# Patient Record
Sex: MALE | Race: WHITE | Employment: FULL TIME | URBAN - METROPOLITAN AREA
[De-identification: names, ages, dates, MRNs, and addresses within clinical notes are randomized per-mention and may not be internally consistent; named-entity substitution may affect disease eponyms.]

---

## 2017-01-11 PROCEDURE — 84403 ASSAY OF TOTAL TESTOSTERONE: CPT | Performed by: UROLOGY

## 2017-01-11 PROCEDURE — 36415 COLL VENOUS BLD VENIPUNCTURE: CPT | Performed by: UROLOGY

## 2017-01-23 ENCOUNTER — HOSPITAL ENCOUNTER (OUTPATIENT)
Age: 42
Discharge: HOME | End: 2017-01-23
Attending: FAMILY MEDICINE
Payer: COMMERCIAL

## 2017-01-23 VITALS
HEIGHT: 73 IN | HEART RATE: 73 BPM | TEMPERATURE: 99 F | SYSTOLIC BLOOD PRESSURE: 119 MMHG | BODY MASS INDEX: 41.08 KG/M2 | WEIGHT: 310 LBS | RESPIRATION RATE: 16 BRPM | DIASTOLIC BLOOD PRESSURE: 79 MMHG | OXYGEN SATURATION: 97 %

## 2017-01-23 DIAGNOSIS — J02.0 STREPTOCOCCAL SORE THROAT: Primary | ICD-10-CM

## 2017-01-23 DIAGNOSIS — L30.0 NUMMULAR ECZEMA: ICD-10-CM

## 2017-01-23 LAB — POCT RAPID STREP: POSITIVE

## 2017-01-23 PROCEDURE — 87430 STREP A AG IA: CPT | Performed by: FAMILY MEDICINE

## 2017-01-23 PROCEDURE — 99214 OFFICE O/P EST MOD 30 MIN: CPT

## 2017-01-23 PROCEDURE — 99213 OFFICE O/P EST LOW 20 MIN: CPT

## 2017-01-23 RX ORDER — CEPHALEXIN 500 MG/1
500 CAPSULE ORAL 3 TIMES DAILY
Qty: 30 CAPSULE | Refills: 0 | Status: SHIPPED | OUTPATIENT
Start: 2017-01-23 | End: 2017-02-02

## 2017-01-23 NOTE — ED PROVIDER NOTES
Patient presents with:  Sore Throat    HPI:     Alexys Mckeon is a 42year old male who presents for evaluation of a chief complaint of sore throat, swollen glands, myalgias, headahce, fever, chills, sinus congestion, slight cough pain while swallowing both ears  Nose: Nares normal. Septum midline. Mucosa normal. No drainage or sinus tenderness.   Throat: abnormal findings: moderate oropharyngeal erythema  Neck: moderate anterior cervical adenopathy, no adenopathy, no carotid bruit, no JVD and supple, sym [address] [facility]  [phone]    In 1 week  For re-check

## 2017-01-23 NOTE — ED INITIAL ASSESSMENT (HPI)
States 10 days of sore throat and slight cough with sinus congestion. No fever. Wife diagnosed with strep this am. Tonsils swollen and red.

## 2017-02-07 DIAGNOSIS — N52.9 VASCULOGENIC ERECTILE DYSFUNCTION, UNSPECIFIED VASCULOGENIC ERECTILE DYSFUNCTION TYPE: Primary | ICD-10-CM

## 2017-02-07 PROCEDURE — 84402 ASSAY OF FREE TESTOSTERONE: CPT | Performed by: UROLOGY

## 2017-02-07 PROCEDURE — 83002 ASSAY OF GONADOTROPIN (LH): CPT | Performed by: UROLOGY

## 2017-02-07 PROCEDURE — 36415 COLL VENOUS BLD VENIPUNCTURE: CPT | Performed by: UROLOGY

## 2017-02-07 PROCEDURE — 84146 ASSAY OF PROLACTIN: CPT | Performed by: UROLOGY

## 2017-02-07 PROCEDURE — 84403 ASSAY OF TOTAL TESTOSTERONE: CPT | Performed by: UROLOGY

## 2017-02-07 RX ORDER — TADALAFIL 10 MG/1
10 TABLET ORAL
Qty: 30 TABLET | Refills: 0 | Status: SHIPPED | OUTPATIENT
Start: 2017-02-07 | End: 2017-10-30

## 2017-02-07 NOTE — TELEPHONE ENCOUNTER
Script sent, but he should be following up with urology also. Looks like they had given him some samples and were adjusting the doses.

## 2017-02-08 NOTE — TELEPHONE ENCOUNTER
Last refill: 11/17/2016 #30 with 1 refill    Last Visit: 11/17/2016    Next Visit: Future Appointments  Date Time Provider Iva Montano          5/18/2017 9:15 AM DO POLINA Simmons [facility] Jackie Serna         Forward to Dr. Dmitri Guzman please advise on re

## 2017-04-19 NOTE — TELEPHONE ENCOUNTER
Last OV 11/17/16  Last refilled 2/8/17  #30  1 refill    Future Appointments  Date Time Provider Iva Montano   5/18/2017 9:15 AM DO POLINA Coon

## 2017-05-16 ENCOUNTER — HOSPITAL ENCOUNTER (OUTPATIENT)
Age: 42
Discharge: HOME | End: 2017-05-16
Attending: EMERGENCY MEDICINE
Payer: COMMERCIAL

## 2017-05-16 VITALS
HEART RATE: 80 BPM | SYSTOLIC BLOOD PRESSURE: 146 MMHG | BODY MASS INDEX: 42 KG/M2 | TEMPERATURE: 98 F | DIASTOLIC BLOOD PRESSURE: 88 MMHG | WEIGHT: 315 LBS | RESPIRATION RATE: 18 BRPM

## 2017-05-16 DIAGNOSIS — I83.91 VARICOSE VEINS OF RIGHT LOWER EXTREMITY: ICD-10-CM

## 2017-05-16 DIAGNOSIS — T14.8XXA ABRASION: Primary | ICD-10-CM

## 2017-05-16 PROCEDURE — 99212 OFFICE O/P EST SF 10 MIN: CPT

## 2017-05-16 PROCEDURE — 99213 OFFICE O/P EST LOW 20 MIN: CPT

## 2017-05-16 NOTE — ED INITIAL ASSESSMENT (HPI)
Today noticed bleeding from right lateral aspect of ankle region - denies injury - states bleeding filled shoe and was \"spurting\" - states applied pressure with gauze and an ace bandage - no active bleeding at present time

## 2017-05-16 NOTE — ED NOTES
Lower extremity (ankle / foot) swollen and red / purple in color - no active bleeding noted at present time

## 2017-05-17 NOTE — ED PROVIDER NOTES
Patient presents with:  Bleeding    HPI:     Gabriel Ray is a 42year old male who presents with chief complaint of bleeding wound to the RLE. Today pt noticed he was bleeding after his shower. That then stopped.   He went to take his child to the  abrasions. Diagnostics:     N/a    MDM:     Pt was experiencing bleeding from a varicose vein likely due to localized trauma. Adequate clot formed at this time, no need for cauterization at this time.   Likely was under a lot of pressure, causing it to

## 2017-05-18 ENCOUNTER — OFFICE VISIT (OUTPATIENT)
Dept: FAMILY MEDICINE CLINIC | Facility: CLINIC | Age: 42
End: 2017-05-18

## 2017-05-18 VITALS
DIASTOLIC BLOOD PRESSURE: 76 MMHG | TEMPERATURE: 98 F | SYSTOLIC BLOOD PRESSURE: 128 MMHG | HEART RATE: 62 BPM | WEIGHT: 315 LBS | BODY MASS INDEX: 43 KG/M2

## 2017-05-18 DIAGNOSIS — N46.9 INFERTILITY MALE: ICD-10-CM

## 2017-05-18 DIAGNOSIS — N52.9 VASCULOGENIC ERECTILE DYSFUNCTION, UNSPECIFIED VASCULOGENIC ERECTILE DYSFUNCTION TYPE: ICD-10-CM

## 2017-05-18 DIAGNOSIS — I83.90 VARICOSE VEIN OF LEG: Primary | ICD-10-CM

## 2017-05-18 DIAGNOSIS — F32.2 SEVERE SINGLE CURRENT EPISODE OF MAJOR DEPRESSIVE DISORDER, WITHOUT PSYCHOTIC FEATURES (HCC): ICD-10-CM

## 2017-05-18 DIAGNOSIS — I83.891 BLEEDING FROM VARICOSE VEIN, RIGHT: ICD-10-CM

## 2017-05-18 PROCEDURE — 99214 OFFICE O/P EST MOD 30 MIN: CPT | Performed by: FAMILY MEDICINE

## 2017-05-18 RX ORDER — FLUOXETINE 20 MG/1
20 TABLET, FILM COATED ORAL DAILY
Qty: 90 TABLET | Refills: 1 | Status: SHIPPED | OUTPATIENT
Start: 2017-05-18 | End: 2017-09-29

## 2017-05-18 NOTE — PROGRESS NOTES
Fady Greene is a 42year old male. Patient presents with:  Medication Follow-Up: perpt   [facility] F/U      HPI:   UC follow up, bleeding: Had a popped varicose vein on the RLE. Was wearing a new brace. Was leaving a blood trail.  Was at a park Alcohol Use: Yes           1.8 oz/week       3 Standard drinks or equivalent per week       Comment: socially       BP Readings from Last 6 Encounters:  05/18/17 : 128/76  05/16/17 : 146/88  01/23/17 : 119/79  12/29/16 : 152/84  11/17/16 : 118/84  09/2 sandals. Bleeding from varicose vein, right  - resolved. Severe single current episode of major depressive disorder, without psychotic features ([address]  -     FLUoxetine HCl 20 MG Oral Tab; Take 1 tablet (20 mg total) by mouth daily.   - continue same

## 2017-07-18 NOTE — TELEPHONE ENCOUNTER
Attempted to contact patient but message states memory is full, enter remote access code.   Unable to leave message Statement Selected

## 2017-09-29 DIAGNOSIS — F32.2 SEVERE SINGLE CURRENT EPISODE OF MAJOR DEPRESSIVE DISORDER, WITHOUT PSYCHOTIC FEATURES (HCC): ICD-10-CM

## 2017-09-29 RX ORDER — FLUOXETINE 20 MG/1
20 TABLET, FILM COATED ORAL DAILY
Qty: 90 TABLET | Refills: 1 | Status: SHIPPED | OUTPATIENT
Start: 2017-09-29 | End: 2017-12-24 | Stop reason: CLARIF

## 2017-09-29 NOTE — TELEPHONE ENCOUNTER
FLUoxetine HCl 20 MG Oral Tab 90 tablet 1 5/18/2017     Sig - Route:  Take 1 tablet (20 mg total) by mouth daily. - Oral      [facility]

## 2017-10-02 ENCOUNTER — HOSPITAL ENCOUNTER (OUTPATIENT)
Age: 42
Discharge: HOME | End: 2017-10-02
Attending: EMERGENCY MEDICINE
Payer: COMMERCIAL

## 2017-10-02 VITALS
OXYGEN SATURATION: 97 % | SYSTOLIC BLOOD PRESSURE: 129 MMHG | HEART RATE: 98 BPM | TEMPERATURE: 99 F | DIASTOLIC BLOOD PRESSURE: 81 MMHG | RESPIRATION RATE: 20 BRPM

## 2017-10-02 DIAGNOSIS — J02.0 STREPTOCOCCAL SORE THROAT: Primary | ICD-10-CM

## 2017-10-02 PROCEDURE — 99214 OFFICE O/P EST MOD 30 MIN: CPT

## 2017-10-02 PROCEDURE — 87430 STREP A AG IA: CPT | Performed by: EMERGENCY MEDICINE

## 2017-10-02 PROCEDURE — 99213 OFFICE O/P EST LOW 20 MIN: CPT

## 2017-10-02 RX ORDER — AMOXICILLIN 500 MG/1
500 TABLET, FILM COATED ORAL 2 TIMES DAILY
Qty: 20 TABLET | Refills: 0 | Status: SHIPPED | OUTPATIENT
Start: 2017-10-02 | End: 2017-10-12

## 2017-10-02 NOTE — ED PROVIDER NOTES
Patient presents with:  Sore Throat  Cough  Body ache and/or chills    HPI:     Magalie Saleh is a 42year old male who presents with chief complaint of sore throat, body aches, cough. Started yesterday. 11year old son recently diagnosed with strep. instructions.

## 2017-10-30 NOTE — PROGRESS NOTES
Ashley Calvin is a 42year old male.  Patient presents with:  Cough: productive   Sore Throat: started friday afternoon  Runny Nose  Discharge: oder, jock area, inflammed    HPI:   Shola Party presents to the office with complaints of upper respiratory tract clotting tendency (HCC)     right leg   • Depression    • DVT (deep venous thrombosis) ([facility])    • Erectile dysfunction 3/31/2016   • Presence of IVC filter 3/31/2016   • Pulmonary embolism [facility])       Past Surgical History:  No date: BACK SURGERY  No date: RRR; no S3, no S4; no click; murmur negative  LUNGS: clear to percussion and auscultation  ABD: non distended, no masses, bowel sounds present, non tender  EXT: no edema    ASSESSMENT AND PLAN:   Ashley Calvin is a 42year old male who presents with To

## 2017-11-26 ENCOUNTER — HOSPITAL ENCOUNTER (OUTPATIENT)
Age: 42
Discharge: HOME | End: 2017-11-26
Attending: EMERGENCY MEDICINE
Payer: COMMERCIAL

## 2017-11-26 ENCOUNTER — APPOINTMENT (OUTPATIENT)
Dept: GENERAL RADIOLOGY | Age: 42
End: 2017-11-26
Attending: EMERGENCY MEDICINE
Payer: COMMERCIAL

## 2017-11-26 VITALS
HEART RATE: 79 BPM | DIASTOLIC BLOOD PRESSURE: 80 MMHG | OXYGEN SATURATION: 98 % | TEMPERATURE: 98 F | BODY MASS INDEX: 41.75 KG/M2 | WEIGHT: 315 LBS | HEIGHT: 73 IN | SYSTOLIC BLOOD PRESSURE: 139 MMHG | RESPIRATION RATE: 20 BRPM

## 2017-11-26 DIAGNOSIS — J06.9 VIRAL URI: Primary | ICD-10-CM

## 2017-11-26 PROCEDURE — 99214 OFFICE O/P EST MOD 30 MIN: CPT

## 2017-11-26 PROCEDURE — 71020 XR CHEST PA + LAT CHEST (CPT=71020): CPT | Performed by: EMERGENCY MEDICINE

## 2017-11-26 PROCEDURE — 94640 AIRWAY INHALATION TREATMENT: CPT

## 2017-11-26 RX ORDER — IPRATROPIUM BROMIDE AND ALBUTEROL SULFATE 2.5; .5 MG/3ML; MG/3ML
3 SOLUTION RESPIRATORY (INHALATION) ONCE
Status: COMPLETED | OUTPATIENT
Start: 2017-11-26 | End: 2017-11-26

## 2017-11-26 RX ORDER — BENZONATATE 200 MG/1
200 CAPSULE ORAL 3 TIMES DAILY PRN
Qty: 30 CAPSULE | Refills: 0 | Status: SHIPPED | OUTPATIENT
Start: 2017-11-26 | End: 2017-12-24 | Stop reason: CLARIF

## 2017-11-26 NOTE — ED PROVIDER NOTES
Patient presents with:  Cough/URI    HPI:     Ivan Bird is a 42year old male who presents with chief complaint of cough. Started about a week ago with cough and congestion. He has had intermittent eye discharge that has resolved.   Cough is assoc [place], XR CHEST PA + LAT CHEST (CPT=71020), 3/02/2016, 12:55. TECHNIQUE:  PA and lateral chest radiographs were obtained.   PATIENT STATED HISTORY: (As transcribed by Technologist)  Patient states he has had a cough and congestion fo

## 2017-11-26 NOTE — ED INITIAL ASSESSMENT (HPI)
Patient has had a cough for about 1 week. He has had strep twice in the past month and now has developed a cough. He had crusted pink eyes a few days ago that are clear today.

## 2017-12-20 ENCOUNTER — HOSPITAL ENCOUNTER (OUTPATIENT)
Age: 42
Discharge: HOME | End: 2017-12-20
Payer: COMMERCIAL

## 2017-12-20 VITALS
HEART RATE: 100 BPM | RESPIRATION RATE: 16 BRPM | WEIGHT: 315 LBS | BODY MASS INDEX: 43 KG/M2 | OXYGEN SATURATION: 99 % | TEMPERATURE: 101 F | DIASTOLIC BLOOD PRESSURE: 79 MMHG | SYSTOLIC BLOOD PRESSURE: 128 MMHG

## 2017-12-20 DIAGNOSIS — R05.9 COUGH: ICD-10-CM

## 2017-12-20 DIAGNOSIS — H10.32 ACUTE BACTERIAL CONJUNCTIVITIS OF LEFT EYE: Primary | ICD-10-CM

## 2017-12-20 PROCEDURE — 99213 OFFICE O/P EST LOW 20 MIN: CPT

## 2017-12-20 PROCEDURE — 99214 OFFICE O/P EST MOD 30 MIN: CPT

## 2017-12-20 RX ORDER — SULFACETAMIDE SODIUM 100 MG/ML
2 SOLUTION/ DROPS OPHTHALMIC
Qty: 1 BOTTLE | Refills: 0 | Status: SHIPPED | OUTPATIENT
Start: 2017-12-20 | End: 2017-12-30

## 2017-12-21 NOTE — ED PROVIDER NOTES
Patient Seen in: [id number] [facility]    History   Patient presents with:  Conjunctivitis  Flu    Stated Complaint: pink eye     76-year-old male who presents to the immediate care with complaints of left eye redness discharge and itchiness fo Negative for ear pain. Eyes: Positive for discharge, redness and itching. Negative for photophobia, pain and visual disturbance. Respiratory: Positive for cough. Negative for shortness of breath and wheezing. Skin: Negative.     Neurological: Ana Dee sounds normal. No respiratory distress. He has no wheezes. He has no rales. He exhibits no tenderness. Dry sporadic cough noted   Musculoskeletal: Normal range of motion. Neurological: He is alert and oriented to person, place, and time.    Skin: Skin i

## 2017-12-24 ENCOUNTER — HOSPITAL ENCOUNTER (OUTPATIENT)
Age: 42
Discharge: HOME | End: 2017-12-24
Payer: COMMERCIAL

## 2017-12-24 ENCOUNTER — APPOINTMENT (OUTPATIENT)
Dept: GENERAL RADIOLOGY | Age: 42
End: 2017-12-24
Attending: NURSE PRACTITIONER
Payer: COMMERCIAL

## 2017-12-24 VITALS
SYSTOLIC BLOOD PRESSURE: 123 MMHG | BODY MASS INDEX: 41.75 KG/M2 | HEIGHT: 73 IN | HEART RATE: 100 BPM | WEIGHT: 315 LBS | OXYGEN SATURATION: 96 % | RESPIRATION RATE: 14 BRPM | TEMPERATURE: 100 F | DIASTOLIC BLOOD PRESSURE: 70 MMHG

## 2017-12-24 DIAGNOSIS — J18.9 COMMUNITY ACQUIRED PNEUMONIA OF LEFT LOWER LOBE OF LUNG: Primary | ICD-10-CM

## 2017-12-24 PROCEDURE — 87081 CULTURE SCREEN ONLY: CPT | Performed by: NURSE PRACTITIONER

## 2017-12-24 PROCEDURE — 87430 STREP A AG IA: CPT | Performed by: NURSE PRACTITIONER

## 2017-12-24 PROCEDURE — 96366 THER/PROPH/DIAG IV INF ADDON: CPT

## 2017-12-24 PROCEDURE — 80047 BASIC METABLC PNL IONIZED CA: CPT

## 2017-12-24 PROCEDURE — 71020 XR CHEST PA + LAT CHEST (CPT=71020): CPT | Performed by: NURSE PRACTITIONER

## 2017-12-24 PROCEDURE — 85025 COMPLETE CBC W/AUTO DIFF WBC: CPT | Performed by: NURSE PRACTITIONER

## 2017-12-24 PROCEDURE — 99214 OFFICE O/P EST MOD 30 MIN: CPT

## 2017-12-24 PROCEDURE — 94640 AIRWAY INHALATION TREATMENT: CPT

## 2017-12-24 PROCEDURE — 99215 OFFICE O/P EST HI 40 MIN: CPT

## 2017-12-24 PROCEDURE — 96365 THER/PROPH/DIAG IV INF INIT: CPT

## 2017-12-24 RX ORDER — SODIUM CHLORIDE 9 MG/ML
1000 INJECTION, SOLUTION INTRAVENOUS ONCE
Status: COMPLETED | OUTPATIENT
Start: 2017-12-24 | End: 2017-12-24

## 2017-12-24 RX ORDER — FLUTICASONE PROPIONATE 50 MCG
1 SPRAY, SUSPENSION (ML) NASAL 2 TIMES DAILY PRN
Qty: 16 G | Refills: 0 | Status: SHIPPED | OUTPATIENT
Start: 2017-12-24 | End: 2020-01-09 | Stop reason: ALTCHOICE

## 2017-12-24 RX ORDER — LEVOFLOXACIN 500 MG/1
500 TABLET, FILM COATED ORAL DAILY
Qty: 10 TABLET | Refills: 0 | Status: SHIPPED | OUTPATIENT
Start: 2017-12-24 | End: 2018-01-03

## 2017-12-24 RX ORDER — BENZONATATE 100 MG/1
100 CAPSULE ORAL 3 TIMES DAILY PRN
Qty: 30 CAPSULE | Refills: 0 | Status: SHIPPED | OUTPATIENT
Start: 2017-12-24 | End: 2018-01-23

## 2017-12-24 RX ORDER — ALBUTEROL SULFATE 90 UG/1
1 AEROSOL, METERED RESPIRATORY (INHALATION) EVERY 4 HOURS PRN
Qty: 1 INHALER | Refills: 0 | Status: SHIPPED | OUTPATIENT
Start: 2017-12-24

## 2017-12-24 RX ORDER — ALBUTEROL SULFATE 90 UG/1
1 AEROSOL, METERED RESPIRATORY (INHALATION) EVERY 4 HOURS PRN
Qty: 1 INHALER | Refills: 0 | Status: SHIPPED | OUTPATIENT
Start: 2017-12-24 | End: 2017-12-24

## 2017-12-24 RX ORDER — POTASSIUM CHLORIDE 20 MEQ/1
40 TABLET, EXTENDED RELEASE ORAL ONCE
Status: COMPLETED | OUTPATIENT
Start: 2017-12-24 | End: 2017-12-24

## 2017-12-24 RX ORDER — CODEINE PHOSPHATE AND GUAIFENESIN 10; 100 MG/5ML; MG/5ML
10 SOLUTION ORAL EVERY 6 HOURS PRN
Qty: 118 ML | Refills: 0 | Status: SHIPPED | OUTPATIENT
Start: 2017-12-24 | End: 2018-01-18 | Stop reason: ALTCHOICE

## 2017-12-24 RX ORDER — IPRATROPIUM BROMIDE AND ALBUTEROL SULFATE 2.5; .5 MG/3ML; MG/3ML
3 SOLUTION RESPIRATORY (INHALATION) ONCE
Status: COMPLETED | OUTPATIENT
Start: 2017-12-24 | End: 2017-12-24

## 2017-12-24 RX ORDER — ERYTHROMYCIN 5 MG/G
1 OINTMENT OPHTHALMIC EVERY 6 HOURS
Qty: 1 G | Refills: 0 | Status: SHIPPED | OUTPATIENT
Start: 2017-12-24 | End: 2017-12-31

## 2017-12-24 RX ORDER — PREDNISONE 20 MG/1
60 TABLET ORAL ONCE
Status: COMPLETED | OUTPATIENT
Start: 2017-12-24 | End: 2017-12-24

## 2017-12-24 NOTE — ED NOTES
Pt back from Xray with Tech, repositioned for comfort, DUO Neb started. Water given with pills, denies needs. Pain 6/10.

## 2017-12-24 NOTE — ED PROVIDER NOTES
[place] [address] [facility] [place]   Patient presents with:  Eye Problem  Cough/URI    Stated Complaint: LEFT RED EYE/SHORTNESS OF BREATH/COUGH/FEVER    41-year-old male presents today with complaints of persistent cough, congest BREATH/COUGH/FEVER  Other systems are as noted in HPI. Constitutional and vital signs reviewed. All other systems reviewed and negative except as noted above.     Physical Exam   ED Triage Vitals [12/24/17 0921]  BP: 140/81  Pulse: 110  Resp: 20  Temp as directed. Patient also given prescription for Cheratussin for cough. Patient also given prescription for erythromycin ointment for the conjunctivitis to the left eye. Prescription for albuterol was given for wheezing or shortness of breath.   Patient

## 2017-12-24 NOTE — ED INITIAL ASSESSMENT (HPI)
Pt states he was treated at [facility] for pink eye and it has worsened.   +cough and SOB

## 2018-01-18 ENCOUNTER — TELEPHONE (OUTPATIENT)
Dept: FAMILY MEDICINE CLINIC | Facility: CLINIC | Age: 43
End: 2018-01-18

## 2018-01-18 ENCOUNTER — OFFICE VISIT (OUTPATIENT)
Dept: FAMILY MEDICINE CLINIC | Facility: CLINIC | Age: 43
End: 2018-01-18

## 2018-01-18 VITALS
HEIGHT: 71 IN | HEART RATE: 76 BPM | SYSTOLIC BLOOD PRESSURE: 126 MMHG | DIASTOLIC BLOOD PRESSURE: 88 MMHG | WEIGHT: 315 LBS | TEMPERATURE: 98 F | RESPIRATION RATE: 20 BRPM | BODY MASS INDEX: 44.1 KG/M2

## 2018-01-18 DIAGNOSIS — J18.9 PNEUMONIA OF LEFT LOWER LOBE DUE TO INFECTIOUS ORGANISM: Primary | ICD-10-CM

## 2018-01-18 DIAGNOSIS — F32.2 SEVERE SINGLE CURRENT EPISODE OF MAJOR DEPRESSIVE DISORDER, WITHOUT PSYCHOTIC FEATURES (HCC): ICD-10-CM

## 2018-01-18 DIAGNOSIS — S89.92XA INJURY OF LEFT KNEE, INITIAL ENCOUNTER: ICD-10-CM

## 2018-01-18 PROCEDURE — 99214 OFFICE O/P EST MOD 30 MIN: CPT | Performed by: FAMILY MEDICINE

## 2018-01-18 RX ORDER — FLUOXETINE 20 MG/1
20 TABLET, FILM COATED ORAL DAILY
Qty: 90 TABLET | Refills: 1 | Status: SHIPPED | OUTPATIENT
Start: 2018-01-18 | End: 2018-01-18 | Stop reason: ALTCHOICE

## 2018-01-18 RX ORDER — FLUOXETINE 20 MG/1
20 TABLET, FILM COATED ORAL DAILY
Qty: 90 TABLET | Refills: 1 | COMMUNITY
Start: 2018-01-18 | End: 2018-01-18

## 2018-01-18 RX ORDER — FLUOXETINE HYDROCHLORIDE 20 MG/1
20 CAPSULE ORAL DAILY
Qty: 90 CAPSULE | Refills: 1 | Status: SHIPPED | OUTPATIENT
Start: 2018-01-18 | End: 2018-12-06

## 2018-01-18 NOTE — PROGRESS NOTES
Jair Issa is a 36year old male. Patient presents with: Follow - Up: from  for pneumonia per pt      HPI:   12/24/17 was in UC with pneumonia. Feeling much better. Now. Finished levaquin. Taking albuterol about 1-2 times per day.  Breathing is b Disp:  Rfl:       Past Medical History:   Diagnosis Date   • Blood clotting tendency ([address]     right leg   • Depression    • DVT (deep venous thrombosis) (HCC)    • Erectile dysfunction 3/31/2016   • Presence of IVC filter 3/31/2016   • Pulmonary embolism Pneumonia of left lower lobe due to infectious organism (hcc)  (primary encounter diagnosis)  Severe single current episode of major depressive disorder, without psychotic features (hcc)  Injury of left knee, initial encounter    Diagnoses and all orde

## 2018-01-18 NOTE — TELEPHONE ENCOUNTER
Galina from [address] called, wondering if we can change the script for generic (FLUoxetine HCl 20 MG Oral Tab) for Prozac from table to capsule? Tablets cost $50.00 each and capsules cost $1.61 each.    Please call pharmacy at [phone]

## 2018-01-25 ENCOUNTER — OFFICE VISIT (OUTPATIENT)
Dept: FAMILY MEDICINE CLINIC | Facility: CLINIC | Age: 43
End: 2018-01-25

## 2018-01-25 ENCOUNTER — TELEPHONE (OUTPATIENT)
Dept: FAMILY MEDICINE CLINIC | Facility: CLINIC | Age: 43
End: 2018-01-25

## 2018-01-25 VITALS
RESPIRATION RATE: 16 BRPM | HEIGHT: 71 IN | HEART RATE: 80 BPM | WEIGHT: 315 LBS | DIASTOLIC BLOOD PRESSURE: 82 MMHG | SYSTOLIC BLOOD PRESSURE: 120 MMHG | TEMPERATURE: 97 F | BODY MASS INDEX: 44.1 KG/M2

## 2018-01-25 DIAGNOSIS — F32.4 MAJOR DEPRESSIVE DISORDER WITH SINGLE EPISODE, IN PARTIAL REMISSION (HCC): ICD-10-CM

## 2018-01-25 DIAGNOSIS — E66.01 MORBID OBESITY (HCC): ICD-10-CM

## 2018-01-25 DIAGNOSIS — R06.00 DOE (DYSPNEA ON EXERTION): ICD-10-CM

## 2018-01-25 DIAGNOSIS — Z00.01 ENCOUNTER FOR ROUTINE ADULT PHYSICAL EXAM WITH ABNORMAL FINDINGS: Primary | ICD-10-CM

## 2018-01-25 DIAGNOSIS — Z82.49 FAMILY HISTORY OF MI (MYOCARDIAL INFARCTION): ICD-10-CM

## 2018-01-25 PROCEDURE — 36415 COLL VENOUS BLD VENIPUNCTURE: CPT | Performed by: FAMILY MEDICINE

## 2018-01-25 PROCEDURE — 80053 COMPREHEN METABOLIC PANEL: CPT | Performed by: FAMILY MEDICINE

## 2018-01-25 PROCEDURE — 85025 COMPLETE CBC W/AUTO DIFF WBC: CPT | Performed by: FAMILY MEDICINE

## 2018-01-25 PROCEDURE — 83036 HEMOGLOBIN GLYCOSYLATED A1C: CPT | Performed by: FAMILY MEDICINE

## 2018-01-25 PROCEDURE — 99396 PREV VISIT EST AGE 40-64: CPT | Performed by: FAMILY MEDICINE

## 2018-01-25 PROCEDURE — 80061 LIPID PANEL: CPT | Performed by: FAMILY MEDICINE

## 2018-01-25 NOTE — PROGRESS NOTES
Muriel Nichole is a 36year old male who presents for a complete physical exam.   HPI:   Pt complains of wanting to make sure he is well enough to exercise. No chest pain or pressure, but family h/o heart disease in most males on dad's side.  Some SOB wi Cyclobenzaprine HCl (FLEXERIL OR) Take  by mouth.  Disp:  Rfl:       Past Medical History:   Diagnosis Date   • Blood clotting tendency ([address]     right leg   • Depression    • DVT (deep venous thrombosis) (HCC)    • Erectile dysfunction 3/31/2016   • Pres long   NEURO: denies headaches  PSYCHE: denies depression or anxiety, moods have been bouncing with all that has been going on with a death in the family.    HEMATOLOGIC: denies hx of anemia   ENDOCRINE: denies thyroid history  ALL/ASTHMA: denies hx of nasir This Encounter      CBC With Differential With Platelet      Comp Metabolic Panel (14)      Hemoglobin A1C      Lipid Panel      *Venipuncture    Meds & Refills for this Visit:  No prescriptions requested or ordered in this encounter    Imaging & Consults:

## 2018-01-25 NOTE — TELEPHONE ENCOUNTER
Patient called and advised that he might be a few minutes late for his appointment with Dr Sol Altman at 1pm as he got stuck by a train. Advised patient he could still come if he wanted to but depending on how late he is he might have to reschedule.  We will ch

## 2018-01-26 LAB
ALBUMIN SERPL-MCNC: 3.6 G/DL (ref 3.5–4.8)
ALP LIVER SERPL-CCNC: 79 U/L (ref 45–117)
ALT SERPL-CCNC: 44 U/L (ref 17–63)
AST SERPL-CCNC: 22 U/L (ref 15–41)
BASOPHILS # BLD AUTO: 0.04 X10(3) UL (ref 0–0.1)
BASOPHILS NFR BLD AUTO: 0.6 %
BILIRUB SERPL-MCNC: 1.2 MG/DL (ref 0.1–2)
BUN BLD-MCNC: 21 MG/DL (ref 8–20)
CALCIUM BLD-MCNC: 8.4 MG/DL (ref 8.3–10.3)
CHLORIDE: 104 MMOL/L (ref 101–111)
CHOLEST SMN-MCNC: 152 MG/DL (ref ?–200)
CO2: 29 MMOL/L (ref 22–32)
CREAT BLD-MCNC: 0.94 MG/DL (ref 0.7–1.3)
EOSINOPHIL # BLD AUTO: 0.16 X10(3) UL (ref 0–0.3)
EOSINOPHIL NFR BLD AUTO: 2.5 %
ERYTHROCYTE [DISTWIDTH] IN BLOOD BY AUTOMATED COUNT: 12.7 % (ref 11.5–16)
EST. AVERAGE GLUCOSE BLD GHB EST-MCNC: 111 MG/DL (ref 68–126)
GLUCOSE BLD-MCNC: 84 MG/DL (ref 70–99)
HBA1C MFR BLD HPLC: 5.5 % (ref ?–5.7)
HCT VFR BLD AUTO: 42.6 % (ref 37–53)
HDLC SERPL-MCNC: 41 MG/DL (ref 45–?)
HDLC SERPL: 3.71 {RATIO} (ref ?–4.97)
HGB BLD-MCNC: 14.2 G/DL (ref 13–17)
IMMATURE GRANULOCYTE COUNT: 0.03 X10(3) UL (ref 0–1)
IMMATURE GRANULOCYTE RATIO %: 0.5 %
LDLC SERPL CALC-MCNC: 92 MG/DL (ref ?–130)
LYMPHOCYTES # BLD AUTO: 1.49 X10(3) UL (ref 0.9–4)
LYMPHOCYTES NFR BLD AUTO: 23.3 %
M PROTEIN MFR SERPL ELPH: 7.5 G/DL (ref 6.1–8.3)
MCH RBC QN AUTO: 29.9 PG (ref 27–33.2)
MCHC RBC AUTO-ENTMCNC: 33.3 G/DL (ref 31–37)
MCV RBC AUTO: 89.7 FL (ref 80–99)
MONOCYTES # BLD AUTO: 0.48 X10(3) UL (ref 0.1–0.6)
MONOCYTES NFR BLD AUTO: 7.5 %
NEUTROPHIL ABS PRELIM: 4.19 X10 (3) UL (ref 1.3–6.7)
NEUTROPHILS # BLD AUTO: 4.19 X10(3) UL (ref 1.3–6.7)
NEUTROPHILS NFR BLD AUTO: 65.6 %
NONHDLC SERPL-MCNC: 111 MG/DL (ref ?–130)
PLATELET # BLD AUTO: 190 10(3)UL (ref 150–450)
POTASSIUM SERPL-SCNC: 4 MMOL/L (ref 3.6–5.1)
RBC # BLD AUTO: 4.75 X10(6)UL (ref 4.3–5.7)
RED CELL DISTRIBUTION WIDTH-SD: 41.4 FL (ref 35.1–46.3)
SODIUM SERPL-SCNC: 139 MMOL/L (ref 136–144)
TRIGL SERPL-MCNC: 94 MG/DL (ref ?–150)
VLDLC SERPL CALC-MCNC: 19 MG/DL (ref 5–40)
WBC # BLD AUTO: 6.4 X10(3) UL (ref 4–13)

## 2018-02-07 ENCOUNTER — HOSPITAL ENCOUNTER (OUTPATIENT)
Dept: CV DIAGNOSTICS | Facility: HOSPITAL | Age: 43
Discharge: HOME | End: 2018-02-07
Attending: FAMILY MEDICINE
Payer: COMMERCIAL

## 2018-02-07 DIAGNOSIS — Z82.49 FAMILY HISTORY OF MI (MYOCARDIAL INFARCTION): ICD-10-CM

## 2018-02-07 DIAGNOSIS — R06.00 DOE (DYSPNEA ON EXERTION): ICD-10-CM

## 2018-02-07 DIAGNOSIS — E66.01 MORBID OBESITY (HCC): ICD-10-CM

## 2018-02-07 PROCEDURE — 93018 CV STRESS TEST I&R ONLY: CPT | Performed by: FAMILY MEDICINE

## 2018-02-07 PROCEDURE — 93350 STRESS TTE ONLY: CPT | Performed by: FAMILY MEDICINE

## 2018-02-07 PROCEDURE — 93017 CV STRESS TEST TRACING ONLY: CPT | Performed by: FAMILY MEDICINE

## 2018-05-08 DIAGNOSIS — B36.9 FUNGAL DERMATITIS: ICD-10-CM

## 2018-05-08 NOTE — TELEPHONE ENCOUNTER
PT STOPPED IN AND ADV THAT HE NEEDS REFILLS ON     FLUoxetine HCl 20 MG Oral Cap    AND       \"JOCK ITCH CREAM\"    PLEASE SEND TO [facility].     ADV DR OUT OF OFFICE UNTIL Thursday--F/U

## 2018-05-08 NOTE — TELEPHONE ENCOUNTER
Spoke with staff at [facility] who states they have script on file for fluoxetine 20 mg and will ready for patient.       Only needs refill of clotrimazole-betamethasone cream    Last OV 1/25/18  Last refilled 10/30/17  60 g  0 refills

## 2018-05-09 RX ORDER — CLOTRIMAZOLE AND BETAMETHASONE DIPROPIONATE 10; .64 MG/G; MG/G
1 CREAM TOPICAL 2 TIMES DAILY PRN
Qty: 60 G | Refills: 0 | Status: SHIPPED | OUTPATIENT
Start: 2018-05-09 | End: 2018-05-19

## 2018-09-13 ENCOUNTER — TELEPHONE (OUTPATIENT)
Dept: FAMILY MEDICINE CLINIC | Facility: CLINIC | Age: 43
End: 2018-09-13

## 2018-09-13 NOTE — TELEPHONE ENCOUNTER
Left message on voicemail/answering machine for patient to call office     Per Dr Michael You, patient is due for f/u for depression

## 2018-10-04 NOTE — TELEPHONE ENCOUNTER
Cell number is only number listed for patient on patient consent form. Do you want me to contact emergency contact?

## 2018-10-04 NOTE — TELEPHONE ENCOUNTER
Attempted to contact patient and again message states not a working number  Letter was sent 9/21/18    Please advises if any further action needed

## 2018-10-09 NOTE — TELEPHONE ENCOUNTER
Discussed with Dr Horvath Friday who states no need to contact emergency contact.    We have tried the patient contact number on file and sent a letter

## 2018-11-23 ENCOUNTER — IMAGING SERVICES (OUTPATIENT)
Dept: OTHER | Age: 43
End: 2018-11-23

## 2018-12-06 NOTE — PROGRESS NOTES
Eliud Downey is a 36year old male. Patient presents with: Follow - Up: on stress test, pneumonia  Pain: on right hip       HPI:   Depression: Called many counseling sites this fall, no one called him back. He is frustrated.  He had a dark moment in Oral Tab Take 81 mg by mouth daily. Disp:  Rfl:    Cinnamon 500 MG Oral Tab Take 1,000 mg by mouth daily. Disp:  Rfl:    Cyclobenzaprine HCl (FLEXERIL OR) Take  by mouth.  Disp:  Rfl:       Past Medical History:   Diagnosis Date   • Blood clotting tendency against force and with palpation of the proximal quadriceps muscle on right hip.      ASSESSMENT AND PLAN:     Severe single current episode of major depressive disorder, without psychotic features (hcc)  (primary encounter diagnosis)  Erectile dysfunction

## 2018-12-14 ENCOUNTER — TELEPHONE (OUTPATIENT)
Dept: FAMILY MEDICINE CLINIC | Facility: CLINIC | Age: 43
End: 2018-12-14

## 2018-12-14 NOTE — TELEPHONE ENCOUNTER
Left message on voicemail/answering machine for patient to call office     Dr Joselin Turcios has completed parking placard form   Original placed in  folder.   Copy sent to scanning

## 2019-01-24 ENCOUNTER — IMAGING SERVICES (OUTPATIENT)
Dept: OTHER | Age: 44
End: 2019-01-24

## 2019-12-31 ENCOUNTER — HOSPITAL ENCOUNTER (OUTPATIENT)
Age: 44
Discharge: HOME | End: 2019-12-31
Attending: FAMILY MEDICINE
Payer: COMMERCIAL

## 2019-12-31 VITALS — TEMPERATURE: 99 F | OXYGEN SATURATION: 97 % | RESPIRATION RATE: 18 BRPM | HEART RATE: 85 BPM

## 2019-12-31 DIAGNOSIS — J10.1 INFLUENZA A: Primary | ICD-10-CM

## 2019-12-31 LAB
POCT INFLUENZA A: POSITIVE
POCT INFLUENZA B: NEGATIVE

## 2019-12-31 PROCEDURE — 99213 OFFICE O/P EST LOW 20 MIN: CPT

## 2019-12-31 PROCEDURE — 87502 INFLUENZA DNA AMP PROBE: CPT | Performed by: FAMILY MEDICINE

## 2019-12-31 PROCEDURE — 99214 OFFICE O/P EST MOD 30 MIN: CPT

## 2019-12-31 RX ORDER — OSELTAMIVIR PHOSPHATE 75 MG/1
75 CAPSULE ORAL 2 TIMES DAILY
Qty: 10 CAPSULE | Refills: 0 | Status: SHIPPED | OUTPATIENT
Start: 2019-12-31 | End: 2020-01-05

## 2019-12-31 RX ORDER — BUPROPION HYDROCHLORIDE 100 MG/1
100 TABLET ORAL 2 TIMES DAILY
COMMUNITY
End: 2020-01-09 | Stop reason: DRUGHIGH

## 2019-12-31 RX ORDER — IBUPROFEN 600 MG/1
600 TABLET ORAL ONCE
Status: COMPLETED | OUTPATIENT
Start: 2019-12-31 | End: 2019-12-31

## 2019-12-31 NOTE — ED PROVIDER NOTES
Patient presents with:  Cough/URI    HPI:     Allen Morris is a 38year old male who presents with for chief complaint of fever, chills, nasal congestion, coryza, rhinorrhea, sore throat, cough, headache, myalgias, fatigue, malaise, tiredness along wi Buccal mucosa moist.   Neck: no adenopathy  Lungs: clear to auscultation bilaterally. No chest wall retractions. No respiratory distress.  No tachypnea noted  Heart: S1, S2 normal, no murmur, click, rub or gallop, regular rate and rhythm  Abdomen: soft, non

## 2020-01-09 NOTE — PROGRESS NOTES
Marva Pang is a 38year old male. Patient presents with: Follow - Up: on medications      HPI:   Depression: does not want to see psych anymore. They aren't doing therapy, just prescribing meds.  He is still trying to find a therapist that works wi Readings from Last 6 Encounters:  01/09/20 : (!) 336 lb (152.4 kg)  12/06/18 : (!) 336 lb (152.4 kg)  01/25/18 : (!) 323 lb (146.5 kg)  01/18/18 : (!) 325 lb (147.4 kg)  12/24/17 : (!) 325 lb (147.4 kg)  12/20/17 : (!) 325 lb (147.4 kg)      REVIEW OF SYST

## 2020-01-20 ENCOUNTER — HOSPITAL ENCOUNTER (OUTPATIENT)
Age: 45
Discharge: HOME | End: 2020-01-20
Payer: COMMERCIAL

## 2020-01-20 ENCOUNTER — APPOINTMENT (OUTPATIENT)
Dept: ULTRASOUND IMAGING | Age: 45
End: 2020-01-20
Attending: NURSE PRACTITIONER
Payer: COMMERCIAL

## 2020-01-20 VITALS
DIASTOLIC BLOOD PRESSURE: 101 MMHG | RESPIRATION RATE: 16 BRPM | TEMPERATURE: 97 F | SYSTOLIC BLOOD PRESSURE: 136 MMHG | HEART RATE: 75 BPM | OXYGEN SATURATION: 98 %

## 2020-01-20 DIAGNOSIS — I87.2 VENOUS STASIS DERMATITIS OF RIGHT LOWER EXTREMITY: Primary | ICD-10-CM

## 2020-01-20 PROCEDURE — 99214 OFFICE O/P EST MOD 30 MIN: CPT

## 2020-01-20 PROCEDURE — 93971 EXTREMITY STUDY: CPT | Performed by: NURSE PRACTITIONER

## 2020-01-20 RX ORDER — TRIAMCINOLONE ACETONIDE 0.25 MG/G
1 CREAM TOPICAL 2 TIMES DAILY
Qty: 80 G | Refills: 0 | Status: SHIPPED | OUTPATIENT
Start: 2020-01-20

## 2020-01-20 NOTE — ED PROVIDER NOTES
Patient Seen in: [address] [facility]      History   Patient presents with:  Lower Extremity Injury    Stated Complaint: pain in right leg x 1 week off and on    HPI  Patient is a 53-year-old male past with past medical history of provoked DVT leg x 1 week off and on  Other systems are as noted in HPI. Constitutional and vital signs reviewed. All other systems reviewed and negative except as noted above.     Physical Exam     ED Triage Vitals [01/20/20 1244]   BP (!) 136/101   Pulse 75   Re veins were imaged:  Common, deep, and superficial femoral, popliteal, sapheno-femoral junction, posterior tibial veins, and the contralateral common femoral vein. PATIENT STATED HISTORY: (As transcribed by Technologist)  Patient has RLE pain and swelling.

## 2020-01-20 NOTE — ED INITIAL ASSESSMENT (HPI)
Pt sts swelling, severe itching, sharp pains to right calf for the past 1 week. Denies SOB, chest pain. Hx of PE in 2011 resulting in right leg with a \"pump\" for circulation.

## 2020-06-04 ENCOUNTER — TELEPHONE (OUTPATIENT)
Dept: FAMILY MEDICINE CLINIC | Facility: CLINIC | Age: 45
End: 2020-06-04

## 2020-06-04 NOTE — TELEPHONE ENCOUNTER
Pt pulled muscle in back and would like a medication for it. Please call back.     [facility] [place], [address] [facility] [phone], [phone]

## 2020-06-04 NOTE — TELEPHONE ENCOUNTER
Future Appointments   Date Time Provider Iva Montano   6/5/2020  2:15 PM Laura Kong DO [facility] Sergey Gomez   7/7/2020 10:00 AM DO POLINA Covington

## 2020-06-04 NOTE — TELEPHONE ENCOUNTER
Alma Herrera verbally {consents to a Virtual/Telephone Check-In service on 06/04/20.   Patient understands and accepts financial responsibility for any deductible, co-insurance and/or co-pays associated with this service

## 2020-06-04 NOTE — TELEPHONE ENCOUNTER
Left message on voicemail/answering machine for patient to call office  Asked to schedule video visit

## 2020-06-05 ENCOUNTER — TELEMEDICINE (OUTPATIENT)
Dept: FAMILY MEDICINE CLINIC | Facility: CLINIC | Age: 45
End: 2020-06-05
Payer: COMMERCIAL

## 2020-06-05 DIAGNOSIS — M51.26 DISPLACEMENT OF LUMBAR INTERVERTEBRAL DISC WITHOUT MYELOPATHY: ICD-10-CM

## 2020-06-05 DIAGNOSIS — M62.830 MUSCLE SPASM OF BACK: Primary | ICD-10-CM

## 2020-06-05 PROCEDURE — 99214 OFFICE O/P EST MOD 30 MIN: CPT | Performed by: FAMILY MEDICINE

## 2020-06-05 RX ORDER — CYCLOBENZAPRINE HCL 10 MG
TABLET ORAL
Qty: 20 TABLET | Refills: 0 | Status: SHIPPED | OUTPATIENT
Start: 2020-06-05 | End: 2020-08-21

## 2020-06-05 NOTE — PROGRESS NOTES
This is a telemedicine visit with live, interactive video and audio. Patient understands and accepts financial responsibility for any deductible, co-insurance and/or co-pays associated with this service.     SUBJECTIVE  A week ago had a twinge in lower Allergies   Current Outpatient Medications   Medication Sig Dispense Refill   • cyclobenzaprine 10 MG Oral Tab 0.5 to 1 tablet nightly as needed for muscle spasms 20 tablet 0   • triamcinolone acetonide 0.025 % External Cream Apply 1 Application topically There are limitations of this visit as no physical exam could be performed. Every conscious effort was taken to allow for sufficient and adequate time. This billing was spent on reviewing labs, medications, radiology tests and decision making.   [employer]

## 2020-08-21 ENCOUNTER — OFFICE VISIT (OUTPATIENT)
Dept: FAMILY MEDICINE CLINIC | Facility: CLINIC | Age: 45
End: 2020-08-21
Payer: COMMERCIAL

## 2020-08-21 VITALS
HEIGHT: 71 IN | WEIGHT: 315 LBS | RESPIRATION RATE: 18 BRPM | BODY MASS INDEX: 44.1 KG/M2 | DIASTOLIC BLOOD PRESSURE: 80 MMHG | SYSTOLIC BLOOD PRESSURE: 130 MMHG | OXYGEN SATURATION: 97 % | TEMPERATURE: 99 F | HEART RATE: 79 BPM

## 2020-08-21 DIAGNOSIS — F32.2 SEVERE SINGLE CURRENT EPISODE OF MAJOR DEPRESSIVE DISORDER, WITHOUT PSYCHOTIC FEATURES (HCC): ICD-10-CM

## 2020-08-21 DIAGNOSIS — Z00.00 HEALTHY ADULT ON ROUTINE PHYSICAL EXAMINATION: Primary | ICD-10-CM

## 2020-08-21 DIAGNOSIS — Z86.718 HISTORY OF DVT OF LOWER EXTREMITY: ICD-10-CM

## 2020-08-21 DIAGNOSIS — I89.0 LYMPHEDEMA OF RIGHT LOWER EXTREMITY: ICD-10-CM

## 2020-08-21 PROCEDURE — 3075F SYST BP GE 130 - 139MM HG: CPT | Performed by: FAMILY MEDICINE

## 2020-08-21 PROCEDURE — 3008F BODY MASS INDEX DOCD: CPT | Performed by: FAMILY MEDICINE

## 2020-08-21 PROCEDURE — 99396 PREV VISIT EST AGE 40-64: CPT | Performed by: FAMILY MEDICINE

## 2020-08-21 PROCEDURE — 3079F DIAST BP 80-89 MM HG: CPT | Performed by: FAMILY MEDICINE

## 2020-08-21 RX ORDER — BUPROPION HYDROCHLORIDE 300 MG/1
300 TABLET ORAL DAILY
Qty: 90 TABLET | Refills: 3 | Status: SHIPPED | OUTPATIENT
Start: 2020-08-21 | End: 2021-11-22

## 2020-08-21 NOTE — PROGRESS NOTES
Nico Graham is a 38year old male who presents for a complete physical exam.   HPI:   Pt complains of knee pain. Popped it a couple weeks ago. Wearing a brace at work. He has been walking daily, 1.5 miles after work. Walking with arm weights.  Using a Lab Results   Component Value Date    ALT 44 01/25/2018    ALT 51 11/17/2016    ALT 56 03/31/2016     No results found for: PSA     Current Outpatient Medications   Medication Sig Dispense Refill   • buPROPion HCl ER, XL, (WELLBUTRIN XL) 300 MG Oral Ta No     Occ: [employer]. : yes. Children: 1 son. Exercise: walking daily  Diet: using an blaise to watch closely.       REVIEW OF SYSTEMS:   GENERAL: feels well otherwise  SKIN: denies any unusual skin lesions  EYES:denies blurred vision or double vision exercise, low fat diet, testicular self exam and prostate cancer screening. The patient indicates understanding of these issues and agrees to the plan. The patient is asked to return for CPX in 1 year or sooner if needed.      Healthy adult on routine phys

## 2020-08-22 LAB
ABSOLUTE BASOPHILS: 40 CELLS/UL (ref 0–200)
ABSOLUTE EOSINOPHILS: 241 CELLS/UL (ref 15–500)
ABSOLUTE LYMPHOCYTES: 1541 CELLS/UL (ref 850–3900)
ABSOLUTE MONOCYTES: 422 CELLS/UL (ref 200–950)
ABSOLUTE NEUTROPHILS: 4456 CELLS/UL (ref 1500–7800)
ALBUMIN/GLOBULIN RATIO: 1.5 (CALC) (ref 1–2.5)
ALBUMIN: 4 G/DL (ref 3.6–5.1)
ALKALINE PHOSPHATASE: 79 U/L (ref 36–130)
ALT: 30 U/L (ref 9–46)
AST: 20 U/L (ref 10–40)
BASOPHILS: 0.6 %
BILIRUBIN, TOTAL: 1.3 MG/DL (ref 0.2–1.2)
BUN: 22 MG/DL (ref 7–25)
CALCIUM: 9 MG/DL (ref 8.6–10.3)
CARBON DIOXIDE: 27 MMOL/L (ref 20–32)
CHLORIDE: 105 MMOL/L (ref 98–110)
CHOL/HDLC RATIO: 3.3 (CALC)
CHOLESTEROL, TOTAL: 134 MG/DL
CREATININE: 0.91 MG/DL (ref 0.6–1.35)
EGFR IF AFRICN AM: 118 ML/MIN/1.73M2
EGFR IF NONAFRICN AM: 101 ML/MIN/1.73M2
EOSINOPHILS: 3.6 %
GLOBULIN: 2.7 G/DL (CALC) (ref 1.9–3.7)
GLUCOSE: 97 MG/DL (ref 65–99)
HDL CHOLESTEROL: 41 MG/DL
HEMATOCRIT: 42.4 % (ref 38.5–50)
HEMOGLOBIN: 14.8 G/DL (ref 13.2–17.1)
LDL-CHOLESTEROL: 79 MG/DL (CALC)
LYMPHOCYTES: 23 %
MCH: 30.8 PG (ref 27–33)
MCHC: 34.9 G/DL (ref 32–36)
MCV: 88.1 FL (ref 80–100)
MONOCYTES: 6.3 %
MPV: 11.9 FL (ref 7.5–12.5)
NEUTROPHILS: 66.5 %
NON-HDL CHOLESTEROL: 93 MG/DL (CALC)
PLATELET COUNT: 190 THOUSAND/UL (ref 140–400)
POTASSIUM: 4.1 MMOL/L (ref 3.5–5.3)
PROTEIN, TOTAL: 6.7 G/DL (ref 6.1–8.1)
RDW: 12.1 % (ref 11–15)
RED BLOOD CELL COUNT: 4.81 MILLION/UL (ref 4.2–5.8)
SODIUM: 140 MMOL/L (ref 135–146)
TRIGLYCERIDES: 63 MG/DL
WHITE BLOOD CELL COUNT: 6.7 THOUSAND/UL (ref 3.8–10.8)

## 2020-10-05 ENCOUNTER — TELEPHONE (OUTPATIENT)
Dept: FAMILY MEDICINE CLINIC | Facility: CLINIC | Age: 45
End: 2020-10-05

## 2020-10-05 NOTE — TELEPHONE ENCOUNTER
Vendor is calling to f/u on compression order that was sent to us on 9/30/20. Did we get it? Please call back.

## 2020-10-05 NOTE — TELEPHONE ENCOUNTER
Confirmed with patient that we are authorized to speak with Virginia Dubois from [employer]. States this is regarding his leg pump. Left message for Virginia Dubois stating we had not received any orders. Will await forms.

## 2020-10-05 NOTE — TELEPHONE ENCOUNTER
Left message for patient to call office back. Office phone number provided. Our office has not received any orders on the patient's behalf. Will confirm with patient that it is OK to speak with Particjaime Delgado from [employer] before returning his call.

## 2020-10-06 ENCOUNTER — MED REC SCAN ONLY (OUTPATIENT)
Dept: FAMILY MEDICINE CLINIC | Facility: CLINIC | Age: 45
End: 2020-10-06

## 2020-11-12 ENCOUNTER — TELEPHONE (OUTPATIENT)
Dept: FAMILY MEDICINE CLINIC | Facility: CLINIC | Age: 45
End: 2020-11-12

## 2020-11-12 ENCOUNTER — TELEMEDICINE (OUTPATIENT)
Dept: FAMILY MEDICINE CLINIC | Facility: CLINIC | Age: 45
End: 2020-11-12
Payer: COMMERCIAL

## 2020-11-12 DIAGNOSIS — Z20.822 EXPOSURE TO COVID-19 VIRUS: Primary | ICD-10-CM

## 2020-11-12 PROCEDURE — 99213 OFFICE O/P EST LOW 20 MIN: CPT | Performed by: FAMILY MEDICINE

## 2020-11-12 NOTE — TELEPHONE ENCOUNTER
Patient understands and accepts financial responsibility for any deductible, co-insurance and/or co-pays associated with [facility] verbally consents to a Virtual/Telephone Check-In service on 11/12/2020.

## 2020-11-12 NOTE — PROGRESS NOTES
Visit for Respiratory Illness - Potential COVID-19 Infection    This visit is conducted using Telemedicine with live, interactive video and audio.     SUBJECTIVE    Chief Complaint:  Concern for respiratory illness (including COVID-19 and influenza)    HPI: to go to ER or call 911 for worsening symptoms or acute distress.

## 2020-11-18 ENCOUNTER — APPOINTMENT (OUTPATIENT)
Dept: LAB | Age: 45
End: 2020-11-18
Attending: FAMILY MEDICINE
Payer: COMMERCIAL

## 2020-11-18 DIAGNOSIS — Z20.822 EXPOSURE TO COVID-19 VIRUS: ICD-10-CM

## 2021-03-04 ENCOUNTER — TELEPHONE (OUTPATIENT)
Dept: FAMILY MEDICINE CLINIC | Facility: CLINIC | Age: 46
End: 2021-03-04

## 2021-08-10 ENCOUNTER — TELEPHONE (OUTPATIENT)
Dept: FAMILY MEDICINE CLINIC | Facility: CLINIC | Age: 46
End: 2021-08-10

## 2021-08-10 ENCOUNTER — OFFICE VISIT (OUTPATIENT)
Dept: FAMILY MEDICINE CLINIC | Facility: CLINIC | Age: 46
End: 2021-08-10
Payer: COMMERCIAL

## 2021-08-10 VITALS
RESPIRATION RATE: 20 BRPM | DIASTOLIC BLOOD PRESSURE: 80 MMHG | BODY MASS INDEX: 44.1 KG/M2 | SYSTOLIC BLOOD PRESSURE: 120 MMHG | WEIGHT: 315 LBS | HEIGHT: 71 IN | TEMPERATURE: 98 F | HEART RATE: 103 BPM | OXYGEN SATURATION: 98 %

## 2021-08-10 DIAGNOSIS — I89.0 LYMPHEDEMA OF RIGHT LOWER EXTREMITY: ICD-10-CM

## 2021-08-10 DIAGNOSIS — H52.209 ASTIGMATISM, UNSPECIFIED LATERALITY, UNSPECIFIED TYPE: ICD-10-CM

## 2021-08-10 DIAGNOSIS — L84 CALLUS OF FOOT: ICD-10-CM

## 2021-08-10 DIAGNOSIS — H91.90 DECREASED HEARING, UNSPECIFIED LATERALITY: ICD-10-CM

## 2021-08-10 DIAGNOSIS — L30.9 DERMATITIS: ICD-10-CM

## 2021-08-10 DIAGNOSIS — H53.9 VISION CHANGES: ICD-10-CM

## 2021-08-10 DIAGNOSIS — E66.01 CLASS 3 SEVERE OBESITY WITH SERIOUS COMORBIDITY AND BODY MASS INDEX (BMI) OF 45.0 TO 49.9 IN ADULT, UNSPECIFIED OBESITY TYPE (HCC): ICD-10-CM

## 2021-08-10 DIAGNOSIS — S81.801A WOUND OF RIGHT LOWER EXTREMITY, INITIAL ENCOUNTER: Primary | ICD-10-CM

## 2021-08-10 PROCEDURE — 87070 CULTURE OTHR SPECIMN AEROBIC: CPT | Performed by: FAMILY MEDICINE

## 2021-08-10 PROCEDURE — 87205 SMEAR GRAM STAIN: CPT | Performed by: FAMILY MEDICINE

## 2021-08-10 PROCEDURE — 3008F BODY MASS INDEX DOCD: CPT | Performed by: FAMILY MEDICINE

## 2021-08-10 PROCEDURE — 99214 OFFICE O/P EST MOD 30 MIN: CPT | Performed by: FAMILY MEDICINE

## 2021-08-10 PROCEDURE — 3074F SYST BP LT 130 MM HG: CPT | Performed by: FAMILY MEDICINE

## 2021-08-10 PROCEDURE — 87186 SC STD MICRODIL/AGAR DIL: CPT | Performed by: FAMILY MEDICINE

## 2021-08-10 PROCEDURE — 87077 CULTURE AEROBIC IDENTIFY: CPT | Performed by: FAMILY MEDICINE

## 2021-08-10 PROCEDURE — 3079F DIAST BP 80-89 MM HG: CPT | Performed by: FAMILY MEDICINE

## 2021-08-10 NOTE — PROGRESS NOTES
Paula Severino is a 54year old male. Patient presents with:  Wound Care: right medial lower leg. noticed 3 months ago      HPI:   Wound on lower right leg started 3 months ago. Skin keep stretching and re-opening with movement.    Has been traveling a from Last 6 Encounters:  08/10/21 : 120/80  08/21/20 : 130/80  01/20/20 : (!) 136/101  01/09/20 : 114/70  12/06/18 : 128/86  01/25/18 : 120/82      Wt Readings from Last 6 Encounters:  08/10/21 : (!) 347 lb (157.4 kg)  08/21/20 : (!) 326 lb 8 oz (148.1 kg) home as that can damage new tissue growing back. - use lymphedema pump regularly  - compression stockings or wrap is recommended. - it was covered with abx ointment, gauze, and wrapped with compression dressing today before he left.    - do not submerse

## 2021-08-10 NOTE — TELEPHONE ENCOUNTER
At pt's office visit, he forgot to ask for referral to Nutritionist for weight loss, podiatrist for feet issues, cracked callouses, ENT for hearing loss he says his wife says he has. He listens to television with volume really elevated.  He also needs to se

## 2021-08-13 ENCOUNTER — TELEPHONE (OUTPATIENT)
Dept: FAMILY MEDICINE CLINIC | Facility: CLINIC | Age: 46
End: 2021-08-13

## 2021-08-13 DIAGNOSIS — T14.8XXA WOUND INFECTION: Primary | ICD-10-CM

## 2021-08-13 DIAGNOSIS — L08.9 WOUND INFECTION: Primary | ICD-10-CM

## 2021-08-13 RX ORDER — CEPHALEXIN 500 MG/1
500 CAPSULE ORAL 3 TIMES DAILY
Qty: 21 CAPSULE | Refills: 0 | Status: SHIPPED | OUTPATIENT
Start: 2021-08-13 | End: 2021-08-20

## 2021-08-13 NOTE — TELEPHONE ENCOUNTER
Pls let pt know that the culture of his leg did grow a bacteria that we should treat. I will sent in an antibiotic for 7 days to treat that.    Let me know if it is not starting to get better in the next few weeks, I would want you to see a wound care speci

## 2021-09-25 ENCOUNTER — OFFICE VISIT (OUTPATIENT)
Dept: NUTRITION | Facility: HOSPITAL | Age: 46
End: 2021-09-25
Attending: FAMILY MEDICINE
Payer: COMMERCIAL

## 2021-09-25 PROCEDURE — 97802 MEDICAL NUTRITION INDIV IN: CPT

## 2021-09-25 NOTE — PROGRESS NOTES
ADULT INITIAL OUTPATIENT NUTRITION CONSULTATION    Nutrition Assessment    Medical Diagnosis: Obesity    PMH: DVT lower extremity, depression, PE    Physical Findings: pt walking with cane today and for long distances    Client Hx: 54year old male    Med portion control, label reading, and choosing nutrient dense foods. Pt does like to track intake on [employer] blaise. Proper self care encouraged/stress/sleep management and coping mechanisms. Pt aware of need to increase activity and reduce/avoid soda.  Mindful eati

## 2021-11-22 DIAGNOSIS — F32.2 SEVERE SINGLE CURRENT EPISODE OF MAJOR DEPRESSIVE DISORDER, WITHOUT PSYCHOTIC FEATURES (HCC): ICD-10-CM

## 2021-11-22 RX ORDER — BUPROPION HYDROCHLORIDE 300 MG/1
TABLET ORAL
Qty: 15 TABLET | Refills: 0 | Status: SHIPPED | OUTPATIENT
Start: 2021-11-22 | End: 2021-11-23

## 2021-11-22 NOTE — TELEPHONE ENCOUNTER
Routing to provider per protocol. Last refilled on 8/21/20 for # 90 with 3 rf. Last seen on 8/10/21. No future appointments. Thank you.

## 2021-11-23 RX ORDER — BUPROPION HYDROCHLORIDE 300 MG/1
300 TABLET ORAL DAILY
Qty: 90 TABLET | Refills: 0 | Status: SHIPPED | OUTPATIENT
Start: 2021-11-23

## 2021-11-23 NOTE — TELEPHONE ENCOUNTER
Patient notified and verbalized understanding. Advised will need annual physical and fasting labs prior to next refill. Script for bupropion #15 tabs cancelled with Jazmyne Euceda at [facility]. Advised to fell script for #90 sent today.

## 2022-03-08 ENCOUNTER — HOSPITAL ENCOUNTER (EMERGENCY)
Age: 47
Discharge: HOME | End: 2022-03-08
Attending: EMERGENCY MEDICINE
Payer: COMMERCIAL

## 2022-03-08 ENCOUNTER — HOSPITAL ENCOUNTER (OUTPATIENT)
Age: 47
Discharge: ANOTHER HOSPITAL | End: 2022-03-08
Payer: COMMERCIAL

## 2022-03-08 ENCOUNTER — APPOINTMENT (OUTPATIENT)
Dept: ULTRASOUND IMAGING | Age: 47
End: 2022-03-08
Attending: EMERGENCY MEDICINE
Payer: COMMERCIAL

## 2022-03-08 VITALS
WEIGHT: 315 LBS | HEIGHT: 73 IN | OXYGEN SATURATION: 96 % | SYSTOLIC BLOOD PRESSURE: 159 MMHG | TEMPERATURE: 98 F | BODY MASS INDEX: 41.75 KG/M2 | RESPIRATION RATE: 20 BRPM | DIASTOLIC BLOOD PRESSURE: 91 MMHG | HEART RATE: 78 BPM

## 2022-03-08 VITALS
HEIGHT: 73 IN | BODY MASS INDEX: 41.75 KG/M2 | HEART RATE: 69 BPM | OXYGEN SATURATION: 96 % | RESPIRATION RATE: 18 BRPM | TEMPERATURE: 99 F | DIASTOLIC BLOOD PRESSURE: 86 MMHG | SYSTOLIC BLOOD PRESSURE: 141 MMHG | WEIGHT: 315 LBS

## 2022-03-08 DIAGNOSIS — I89.0 LYMPHEDEMA: Primary | ICD-10-CM

## 2022-03-08 DIAGNOSIS — L03.115 CELLULITIS OF RIGHT LOWER EXTREMITY: ICD-10-CM

## 2022-03-08 DIAGNOSIS — R60.0 EDEMA OF RIGHT LOWER LEG: Primary | ICD-10-CM

## 2022-03-08 PROCEDURE — 99284 EMERGENCY DEPT VISIT MOD MDM: CPT

## 2022-03-08 PROCEDURE — 93971 EXTREMITY STUDY: CPT | Performed by: EMERGENCY MEDICINE

## 2022-03-08 PROCEDURE — 99205 OFFICE O/P NEW HI 60 MIN: CPT | Performed by: NURSE PRACTITIONER

## 2022-03-08 RX ORDER — CEPHALEXIN 500 MG/1
500 CAPSULE ORAL 4 TIMES DAILY
Qty: 28 CAPSULE | Refills: 0 | Status: SHIPPED | OUTPATIENT
Start: 2022-03-08 | End: 2022-03-15

## 2022-03-08 NOTE — ED INITIAL ASSESSMENT (HPI)
Onset 1400 yesterday of burning sensation to right calf. States hx of dvt 10 years ago, PE and 3 dvt's in leg. Was on blood thinner for a few years,now on aspirin. States his RLE is always swollen due to blood clots and damage to artery. Today leg feels tight.

## 2022-03-23 NOTE — TELEPHONE ENCOUNTER
LOV: 8/10/21   Last Refill: 11/23/21 #90 0 RF      Medication pended for 1 month supply until pt visit    Future Appointments   Date Time Provider Iva Montano   4/18/2022 10:00 AM DO POLINA Lee

## 2022-03-23 NOTE — TELEPHONE ENCOUNTER
PT HAS SCHEDULED COMP PX FOR 4/18.     PT ADV NEEDS REFILL OF     buPROPion 300 MG Oral Tablet 24 Hr    PLEASE SEND TO [facility]      WOULD LIKE TO KNOW IF PT CAN GET 1 MONTH SCRIPT UNTIL SEEN      THANK YOU

## 2022-03-24 RX ORDER — BUPROPION HYDROCHLORIDE 300 MG/1
300 TABLET ORAL DAILY
Qty: 30 TABLET | Refills: 0 | Status: SHIPPED | OUTPATIENT
Start: 2022-03-24

## 2022-04-18 ENCOUNTER — TELEPHONE (OUTPATIENT)
Dept: FAMILY MEDICINE CLINIC | Facility: CLINIC | Age: 47
End: 2022-04-18

## 2022-04-18 ENCOUNTER — OFFICE VISIT (OUTPATIENT)
Dept: FAMILY MEDICINE CLINIC | Facility: CLINIC | Age: 47
End: 2022-04-18
Payer: COMMERCIAL

## 2022-04-18 VITALS
WEIGHT: 315 LBS | DIASTOLIC BLOOD PRESSURE: 92 MMHG | BODY MASS INDEX: 41.75 KG/M2 | HEIGHT: 73 IN | HEART RATE: 78 BPM | SYSTOLIC BLOOD PRESSURE: 124 MMHG | TEMPERATURE: 97 F | RESPIRATION RATE: 16 BRPM | OXYGEN SATURATION: 98 %

## 2022-04-18 DIAGNOSIS — I89.0 LYMPHEDEMA OF RIGHT LOWER EXTREMITY: ICD-10-CM

## 2022-04-18 DIAGNOSIS — Z23 NEED FOR VACCINATION: ICD-10-CM

## 2022-04-18 DIAGNOSIS — E66.01 CLASS 3 SEVERE OBESITY DUE TO EXCESS CALORIES WITH SERIOUS COMORBIDITY AND BODY MASS INDEX (BMI) OF 45.0 TO 49.9 IN ADULT (HCC): ICD-10-CM

## 2022-04-18 DIAGNOSIS — Z00.00 HEALTHY ADULT ON ROUTINE PHYSICAL EXAMINATION: Primary | ICD-10-CM

## 2022-04-18 DIAGNOSIS — F32.2 SEVERE SINGLE CURRENT EPISODE OF MAJOR DEPRESSIVE DISORDER, WITHOUT PSYCHOTIC FEATURES (HCC): ICD-10-CM

## 2022-04-18 PROCEDURE — 90715 TDAP VACCINE 7 YRS/> IM: CPT | Performed by: FAMILY MEDICINE

## 2022-04-18 PROCEDURE — 99396 PREV VISIT EST AGE 40-64: CPT | Performed by: FAMILY MEDICINE

## 2022-04-18 PROCEDURE — 3074F SYST BP LT 130 MM HG: CPT | Performed by: FAMILY MEDICINE

## 2022-04-18 PROCEDURE — 3080F DIAST BP >= 90 MM HG: CPT | Performed by: FAMILY MEDICINE

## 2022-04-18 PROCEDURE — 90471 IMMUNIZATION ADMIN: CPT | Performed by: FAMILY MEDICINE

## 2022-04-18 PROCEDURE — 3008F BODY MASS INDEX DOCD: CPT | Performed by: FAMILY MEDICINE

## 2022-04-18 RX ORDER — BUPROPION HYDROCHLORIDE 300 MG/1
300 TABLET ORAL DAILY
Qty: 90 TABLET | Refills: 0 | Status: SHIPPED | OUTPATIENT
Start: 2022-04-18

## 2022-04-18 NOTE — TELEPHONE ENCOUNTER
Parking placard completed and placed at  for     Patient notified and verbalized understanding.      Copy to scan

## 2022-04-19 LAB
ABSOLUTE BASOPHILS: 40 CELLS/UL (ref 0–200)
ABSOLUTE EOSINOPHILS: 161 CELLS/UL (ref 15–500)
ABSOLUTE LYMPHOCYTES: 1400 CELLS/UL (ref 850–3900)
ABSOLUTE MONOCYTES: 469 CELLS/UL (ref 200–950)
ABSOLUTE NEUTROPHILS: 4630 CELLS/UL (ref 1500–7800)
ALBUMIN/GLOBULIN RATIO: 1.6 (CALC) (ref 1–2.5)
ALBUMIN: 4.3 G/DL (ref 3.6–5.1)
ALKALINE PHOSPHATASE: 86 U/L (ref 36–130)
ALT: 33 U/L (ref 9–46)
AST: 20 U/L (ref 10–40)
BASOPHILS: 0.6 %
BILIRUBIN, TOTAL: 1.2 MG/DL (ref 0.2–1.2)
BUN: 20 MG/DL (ref 7–25)
CALCIUM: 9 MG/DL (ref 8.6–10.3)
CARBON DIOXIDE: 29 MMOL/L (ref 20–32)
CHLORIDE: 102 MMOL/L (ref 98–110)
CHOL/HDLC RATIO: 3.8 (CALC)
CHOLESTEROL, TOTAL: 155 MG/DL
CREATININE: 0.89 MG/DL (ref 0.6–1.35)
EGFR IF AFRICN AM: 118 ML/MIN/1.73M2
EGFR IF NONAFRICN AM: 102 ML/MIN/1.73M2
EOSINOPHILS: 2.4 %
GLOBULIN: 2.7 G/DL (CALC) (ref 1.9–3.7)
GLUCOSE: 84 MG/DL (ref 65–99)
HDL CHOLESTEROL: 41 MG/DL
HEMATOCRIT: 42.1 % (ref 38.5–50)
HEMOGLOBIN A1C: 5.6 % OF TOTAL HGB
HEMOGLOBIN: 14.9 G/DL (ref 13.2–17.1)
LDL-CHOLESTEROL: 97 MG/DL (CALC)
LYMPHOCYTES: 20.9 %
MCH: 31 PG (ref 27–33)
MCHC: 35.4 G/DL (ref 32–36)
MCV: 87.5 FL (ref 80–100)
MONOCYTES: 7 %
MPV: 12 FL (ref 7.5–12.5)
NEUTROPHILS: 69.1 %
NON-HDL CHOLESTEROL: 114 MG/DL (CALC)
PLATELET COUNT: 198 THOUSAND/UL (ref 140–400)
POTASSIUM: 4.3 MMOL/L (ref 3.5–5.3)
PROTEIN, TOTAL: 7 G/DL (ref 6.1–8.1)
RDW: 12.2 % (ref 11–15)
RED BLOOD CELL COUNT: 4.81 MILLION/UL (ref 4.2–5.8)
SODIUM: 139 MMOL/L (ref 135–146)
TRIGLYCERIDES: 76 MG/DL
TSH W/REFLEX TO FT4: 1.34 MIU/L (ref 0.4–4.5)
WHITE BLOOD CELL COUNT: 6.7 THOUSAND/UL (ref 3.8–10.8)

## 2022-06-05 DIAGNOSIS — F32.2 SEVERE SINGLE CURRENT EPISODE OF MAJOR DEPRESSIVE DISORDER, WITHOUT PSYCHOTIC FEATURES (HCC): ICD-10-CM

## 2022-06-06 RX ORDER — BUPROPION HYDROCHLORIDE 300 MG/1
TABLET ORAL
Qty: 30 TABLET | Refills: 2 | Status: SHIPPED | OUTPATIENT
Start: 2022-06-06

## 2022-07-09 ENCOUNTER — HOSPITAL ENCOUNTER (OUTPATIENT)
Age: 47
Discharge: HOME | End: 2022-07-09
Payer: COMMERCIAL

## 2022-07-09 VITALS
HEART RATE: 92 BPM | BODY MASS INDEX: 41.75 KG/M2 | OXYGEN SATURATION: 99 % | SYSTOLIC BLOOD PRESSURE: 159 MMHG | WEIGHT: 315 LBS | RESPIRATION RATE: 20 BRPM | TEMPERATURE: 98 F | HEIGHT: 73 IN | DIASTOLIC BLOOD PRESSURE: 89 MMHG

## 2022-07-09 DIAGNOSIS — U07.1 COVID-19: Primary | ICD-10-CM

## 2022-07-09 LAB — SARS-COV-2 RNA RESP QL NAA+PROBE: DETECTED

## 2022-07-09 RX ORDER — NIRMATRELVIR AND RITONAVIR 300-100 MG
KIT ORAL
Qty: 30 TABLET | Refills: 0 | Status: SHIPPED | OUTPATIENT
Start: 2022-07-09 | End: 2022-07-14

## 2022-09-22 ENCOUNTER — OFFICE VISIT (OUTPATIENT)
Dept: FAMILY MEDICINE CLINIC | Facility: CLINIC | Age: 47
End: 2022-09-22

## 2022-09-22 VITALS
OXYGEN SATURATION: 98 % | SYSTOLIC BLOOD PRESSURE: 134 MMHG | BODY MASS INDEX: 41.75 KG/M2 | WEIGHT: 315 LBS | HEART RATE: 78 BPM | RESPIRATION RATE: 20 BRPM | TEMPERATURE: 98 F | HEIGHT: 73 IN | DIASTOLIC BLOOD PRESSURE: 84 MMHG

## 2022-09-22 DIAGNOSIS — J01.00 ACUTE NON-RECURRENT MAXILLARY SINUSITIS: Primary | ICD-10-CM

## 2022-09-22 DIAGNOSIS — R05.1 ACUTE COUGH: ICD-10-CM

## 2022-09-22 PROCEDURE — 99213 OFFICE O/P EST LOW 20 MIN: CPT | Performed by: NURSE PRACTITIONER

## 2022-09-22 PROCEDURE — 3079F DIAST BP 80-89 MM HG: CPT | Performed by: NURSE PRACTITIONER

## 2022-09-22 PROCEDURE — 3008F BODY MASS INDEX DOCD: CPT | Performed by: NURSE PRACTITIONER

## 2022-09-22 PROCEDURE — 3075F SYST BP GE 130 - 139MM HG: CPT | Performed by: NURSE PRACTITIONER

## 2022-09-22 RX ORDER — AMOXICILLIN AND CLAVULANATE POTASSIUM 875; 125 MG/1; MG/1
1 TABLET, FILM COATED ORAL 2 TIMES DAILY
Qty: 20 TABLET | Refills: 0 | Status: SHIPPED | OUTPATIENT
Start: 2022-09-22 | End: 2022-10-02

## 2022-12-08 ENCOUNTER — TELEPHONE (OUTPATIENT)
Dept: FAMILY MEDICINE CLINIC | Facility: CLINIC | Age: 47
End: 2022-12-08

## 2022-12-08 DIAGNOSIS — B35.6 JOCK ITCH: Primary | ICD-10-CM

## 2022-12-08 RX ORDER — CLOTRIMAZOLE AND BETAMETHASONE DIPROPIONATE 10; .64 MG/G; MG/G
1 CREAM TOPICAL 2 TIMES DAILY PRN
Qty: 60 G | Refills: 0 | Status: SHIPPED | OUTPATIENT
Start: 2022-12-08

## 2022-12-08 NOTE — TELEPHONE ENCOUNTER
Patient notified and verbalized understanding.    Advised to let KE know if not clearing up in the next 1-2 weeks

## 2022-12-08 NOTE — TELEPHONE ENCOUNTER
PT CALLED AND ADV NEEDS REFILL OF \"JOCK ITCH\" CREAM.    PT ADV WENT TO [place] AND THINKS HE BROUGHT IT BACK WITH HIM.     PLEASE SEND TO Christina Cobian

## 2023-07-21 ENCOUNTER — TELEPHONE (OUTPATIENT)
Dept: FAMILY MEDICINE CLINIC | Facility: CLINIC | Age: 48
End: 2023-07-21

## 2023-07-21 DIAGNOSIS — N52.9 VASCULOGENIC ERECTILE DYSFUNCTION, UNSPECIFIED VASCULOGENIC ERECTILE DYSFUNCTION TYPE: Primary | ICD-10-CM

## 2023-07-21 NOTE — TELEPHONE ENCOUNTER
Patient notified and verbalized understanding.       Future Appointments   Date Time Provider Iva Montano   8/14/2023  8:30 AM DO POLINA Torres EM[facility]

## 2023-07-21 NOTE — TELEPHONE ENCOUNTER
PT CALLED AND ADV IS NEEDING A REFERRAL TO UROLOGIST - PT ADV THAT THIS IS A F/U TO ED ISSUES    PLEASE ADV    THANK YOU

## 2023-08-14 ENCOUNTER — OFFICE VISIT (OUTPATIENT)
Dept: FAMILY MEDICINE CLINIC | Facility: CLINIC | Age: 48
End: 2023-08-14
Payer: COMMERCIAL

## 2023-08-14 VITALS
DIASTOLIC BLOOD PRESSURE: 84 MMHG | RESPIRATION RATE: 20 BRPM | HEART RATE: 67 BPM | TEMPERATURE: 97 F | BODY MASS INDEX: 45 KG/M2 | WEIGHT: 315 LBS | OXYGEN SATURATION: 98 % | SYSTOLIC BLOOD PRESSURE: 130 MMHG

## 2023-08-14 DIAGNOSIS — E66.01 CLASS 3 SEVERE OBESITY WITH SERIOUS COMORBIDITY AND BODY MASS INDEX (BMI) OF 45.0 TO 49.9 IN ADULT, UNSPECIFIED OBESITY TYPE (HCC): ICD-10-CM

## 2023-08-14 DIAGNOSIS — Z12.5 SCREENING FOR MALIGNANT NEOPLASM OF PROSTATE: ICD-10-CM

## 2023-08-14 DIAGNOSIS — Z00.00 HEALTHY ADULT ON ROUTINE PHYSICAL EXAMINATION: Primary | ICD-10-CM

## 2023-08-14 DIAGNOSIS — F32.A DEPRESSION, UNSPECIFIED DEPRESSION TYPE: ICD-10-CM

## 2023-08-14 PROCEDURE — 3079F DIAST BP 80-89 MM HG: CPT | Performed by: FAMILY MEDICINE

## 2023-08-14 PROCEDURE — 3075F SYST BP GE 130 - 139MM HG: CPT | Performed by: FAMILY MEDICINE

## 2023-08-14 PROCEDURE — 99396 PREV VISIT EST AGE 40-64: CPT | Performed by: FAMILY MEDICINE

## 2023-08-14 RX ORDER — ESCITALOPRAM OXALATE 10 MG/1
10 TABLET ORAL DAILY
Qty: 30 TABLET | Refills: 1 | Status: SHIPPED | OUTPATIENT
Start: 2023-08-14

## 2023-08-14 RX ORDER — CLOBETASOL PROPIONATE 0.46 MG/ML
SOLUTION TOPICAL
COMMUNITY
Start: 2023-04-10

## 2023-08-15 LAB
ABSOLUTE BASOPHILS: 41 CELLS/UL (ref 0–200)
ABSOLUTE EOSINOPHILS: 248 CELLS/UL (ref 15–500)
ABSOLUTE LYMPHOCYTES: 1504 CELLS/UL (ref 850–3900)
ABSOLUTE MONOCYTES: 593 CELLS/UL (ref 200–950)
ABSOLUTE NEUTROPHILS: 4513 CELLS/UL (ref 1500–7800)
ALBUMIN/GLOBULIN RATIO: 1.4 (CALC) (ref 1–2.5)
ALBUMIN: 4 G/DL (ref 3.6–5.1)
ALKALINE PHOSPHATASE: 81 U/L (ref 36–130)
ALT: 32 U/L (ref 9–46)
AST: 21 U/L (ref 10–40)
BASOPHILS: 0.6 %
BILIRUBIN, TOTAL: 1.1 MG/DL (ref 0.2–1.2)
BUN: 17 MG/DL (ref 7–25)
CALCIUM: 8.8 MG/DL (ref 8.6–10.3)
CARBON DIOXIDE: 29 MMOL/L (ref 20–32)
CHLORIDE: 105 MMOL/L (ref 98–110)
CHOL/HDLC RATIO: 3.4 (CALC)
CHOLESTEROL, TOTAL: 149 MG/DL
CREATININE: 0.89 MG/DL (ref 0.6–1.29)
EGFR: 106 ML/MIN/1.73M2
EOSINOPHILS: 3.6 %
GLOBULIN: 2.8 G/DL (CALC) (ref 1.9–3.7)
GLUCOSE: 69 MG/DL (ref 65–139)
HDL CHOLESTEROL: 44 MG/DL
HEMATOCRIT: 43.8 % (ref 38.5–50)
HEMOGLOBIN A1C: 5.4 % OF TOTAL HGB
HEMOGLOBIN: 15 G/DL (ref 13.2–17.1)
LDL-CHOLESTEROL: 87 MG/DL (CALC)
LYMPHOCYTES: 21.8 %
MCH: 30.5 PG (ref 27–33)
MCHC: 34.2 G/DL (ref 32–36)
MCV: 89.2 FL (ref 80–100)
MONOCYTES: 8.6 %
MPV: 12.3 FL (ref 7.5–12.5)
NEUTROPHILS: 65.4 %
NON-HDL CHOLESTEROL: 105 MG/DL (CALC)
PLATELET COUNT: 196 THOUSAND/UL (ref 140–400)
POTASSIUM: 4.2 MMOL/L (ref 3.5–5.3)
PROTEIN, TOTAL: 6.8 G/DL (ref 6.1–8.1)
PSA, TOTAL: 0.38 NG/ML
RDW: 12.1 % (ref 11–15)
RED BLOOD CELL COUNT: 4.91 MILLION/UL (ref 4.2–5.8)
SODIUM: 140 MMOL/L (ref 135–146)
TRIGLYCERIDES: 89 MG/DL
WHITE BLOOD CELL COUNT: 6.9 THOUSAND/UL (ref 3.8–10.8)

## 2023-09-18 ENCOUNTER — OFFICE VISIT (OUTPATIENT)
Dept: SURGERY | Facility: CLINIC | Age: 48
End: 2023-09-18

## 2023-09-18 DIAGNOSIS — R82.90 URINE FINDING: Primary | ICD-10-CM

## 2023-09-18 LAB
APPEARANCE: CLEAR
BILIRUBIN: NEGATIVE
GLUCOSE (URINE DIPSTICK): NEGATIVE MG/DL
KETONES (URINE DIPSTICK): NEGATIVE MG/DL
LEUKOCYTES: NEGATIVE
MULTISTIX LOT#: NORMAL NUMERIC
NITRITE, URINE: NEGATIVE
OCCULT BLOOD: NEGATIVE
PH, URINE: 6 (ref 4.5–8)
PROTEIN (URINE DIPSTICK): NEGATIVE MG/DL
SPECIFIC GRAVITY: 1.02 (ref 1–1.03)
URINE-COLOR: YELLOW
UROBILINOGEN,SEMI-QN: 1 MG/DL (ref 0–1.9)

## 2023-10-08 NOTE — PROGRESS NOTES
Urology Clinic Note    Primary Care Provider:  Esaw Leventhal, DO     Chief Complaint:   ED    HPI:   Tim Wheeler is a 49year old male with history of depression and back surgery referred for erectile dysfunction. Patient reports a long history of erectile dysfunction. This was previously controlled with Viagra. In 2011 he had a back injury with subsequent surgery which resulted in some paralysis in his pelvis; specifically he voids with valsalva. This required several months of therapy. He has since had worsening ED. Patient previously followed with Dr. Damion Aviles for this; at that time he tried Cialis which did not help. He did have low T; 1/11/2017 was 162.3 and 2/7/2017 was 241. He was not started on T and has not wanted therapy since then. He was then lost to follow up with urology. He does see his PCP and has trialed Viagra and [place] and reports continued ED; is able to have erection but not adequate for penetration. He presents now to discuss other options. Of note, patient did see his PCP recently with compaints of mental health decline; was having trouble losing more weight and issues with depression. Was previously on bupropion. This medication was changed to escitalopram. He does not believe this has worsened his ED but he does note decrease in Libido. He has minimaly LUTS at this time; AUA SS is 9. He uses valsalva/kegel to void after his above surgery. He was seen last month. At that time we discussed that his ED may be from untraeted hypogonadism; however he did not want further T testing or therpay and wanted to try ICI. He presents today for teaching. No changes to his health. Is on baby aspirin and understands need for more compression after injection, slightly higher risk of bleeding.              PSA:  Lab Results   Component Value Date    QPSA 0.38 08/14/2023        History:     Past Medical History:   Diagnosis Date    Blood clotting tendency (HCC)     right leg Depression     DVT (deep venous thrombosis) (HCC)     Erectile dysfunction 3/31/2016    Presence of IVC filter 3/31/2016    Pulmonary embolism (HCC)        Past Surgical History:   Procedure Laterality Date    BACK SURGERY      CATH INFERIOR VENA CAVA FILTER INSERTION         Family History   Problem Relation Age of Onset    Psychiatric Father         depression, anger management    Diabetes Father     Other (Other) Father         smoker    Cancer Mother         cervical    Other (Other) Mother         epilpsy/alcolism / in a fire/ESRD on dialysis    Diabetes Paternal Grandfather     Cancer Paternal Grandfather         black lung disease    Heart Disorder Paternal Uncle         stents placed    Heart Disorder Cousin         MI, stents    Other (Other) Maternal Grandmother         skin grafts       Social History     Socioeconomic History    Marital status:    Tobacco Use    Smoking status: Never    Smokeless tobacco: Never   Vaping Use    Vaping Use: Never used   Substance and Sexual Activity    Alcohol use: Yes     Alcohol/week: 3.0 standard drinks of alcohol     Types: 3 Standard drinks or equivalent per week     Comment: socially    Drug use: No       Medications (Active prior to today's visit):  Current Outpatient Medications   Medication Sig Dispense Refill    clobetasol 0.05 % External Solution APPLY TO AFFECTED AREA ON SCALP AT BEDTIME FOR 14 DAYS, THEN AS NEEDED      escitalopram 10 MG Oral Tab Take 1 tablet (10 mg total) by mouth daily. 1/2 tab po qd x 4-6 days, then increase to 1 tab po qd 30 tablet 1    clotrimazole-betamethasone 1-0.05 % External Cream Apply 1 Application. topically 2 (two) times daily as needed.  (Patient not taking: Reported on 2023) 60 g 0    BUPROPION  MG Oral Tablet 24 Hr Take 1 tablet by mouth once daily (Patient not taking: Reported on 2022) 30 tablet 2    Albuterol Sulfate  (90 Base) MCG/ACT Inhalation Aero Soln Inhale 1 puff into the lungs every 4 (four) hours as needed for Wheezing. 1 Inhaler 0    aspirin 81 MG Oral Tab Take 1 tablet (81 mg total) by mouth daily. Cinnamon 500 MG Oral Tab Take 1,000 mg by mouth daily. Cyclobenzaprine HCl (FLEXERIL OR) Take  by mouth. (Patient not taking: Reported on 7/9/2022)         Allergies:  No Known Allergies    Review of Systems:   A comprehensive 10-point review of systems was completed. Pertinent positives and negatives are noted in the the HPI. Physical Exam:   CONSTITUTIONAL: Well developed, well nourished, in no acute distress  NEUROLOGIC: Alert and oriented  HEAD: Normocephalic, atraumatic  EYES: Sclera non-icteric  ENT: Hearing intact, moist mucous membranes  NECK: No obvious goiter or masses  RESPIRATORY: Normal respiratory effort  SKIN: No evident rashes  ABDOMEN: Soft, non-tender, non-distended     Assessment & Plan:      Maggie Crews is a 49year old male with history of depression and back surgery referred for erectile dysfunction. # See previous note for full details; patient has failed PO management. He does not want T testing done despite hx of lower levels. Discussed risks and benefits of trimix in detail. He presents today for trimix teaching. Instruction on injection technique and educational materials provided and reviewed in detail. Injected 0.1 mL (10 units) over 4 seconds without difficulty with moderate response. Examined patient after and no bleeding was noted after pressure held for 2-3 minutes. Erection was detumescing appropriately and he had no other symptoms when seen 10 mins later. Pt counseled on risk of bleeding (higher due to asa81 use) and priapism. Pt instructed that if he needs to increase dose he should not increase by more than 0.05 mL increments at a time. I asked him to use a maximum of 30 units (0.3ml) and to call before increasing past this. Discussed appropriate use of Sudafed if priapism does occur.    Pt aware to call or go to ER if prolonged erection occurs. Will send in Rx for trimix . Return in 2 mo to discuss symptoms. Over 30 minutes spent in consultation and educating this patient today as well as coordination of his care.            Carlos Ngo MD  Staff Urologist  [facility]  Office: [phone]

## 2023-10-09 ENCOUNTER — OFFICE VISIT (OUTPATIENT)
Dept: SURGERY | Facility: CLINIC | Age: 48
End: 2023-10-09

## 2023-10-09 DIAGNOSIS — N52.9 VASCULOGENIC ERECTILE DYSFUNCTION, UNSPECIFIED VASCULOGENIC ERECTILE DYSFUNCTION TYPE: Primary | ICD-10-CM

## 2023-10-09 PROCEDURE — 99214 OFFICE O/P EST MOD 30 MIN: CPT | Performed by: UROLOGY

## 2023-11-02 DIAGNOSIS — F32.A DEPRESSION, UNSPECIFIED DEPRESSION TYPE: ICD-10-CM

## 2023-11-02 RX ORDER — ESCITALOPRAM OXALATE 10 MG/1
10 TABLET ORAL DAILY
Qty: 90 TABLET | Refills: 0 | Status: SHIPPED | OUTPATIENT
Start: 2023-11-02

## 2023-11-02 NOTE — TELEPHONE ENCOUNTER
Last OV   Last refilled 8/14/23  #30  1 refill    Patient states he has had a positive reaction to medication.   Would like to continue  Asking for 90 day supply    Routed to KE to advise

## 2023-11-02 NOTE — TELEPHONE ENCOUNTER
[address] [facility], [address] [place] [address] [phone], [facility]   Phone: [phone] Fax: [phone]   Hours: Not open 24 hours       PATIENT REQUESTS REFILL FOR ESCITALOPRAM 10MG

## 2024-01-04 ENCOUNTER — HOSPITAL ENCOUNTER (OUTPATIENT)
Age: 49
Discharge: HOME | End: 2024-01-04
Payer: COMMERCIAL

## 2024-01-04 ENCOUNTER — APPOINTMENT (OUTPATIENT)
Dept: GENERAL RADIOLOGY | Age: 49
End: 2024-01-04
Attending: PHYSICIAN ASSISTANT
Payer: COMMERCIAL

## 2024-01-04 VITALS
HEIGHT: 73 IN | TEMPERATURE: 97 F | WEIGHT: 315 LBS | SYSTOLIC BLOOD PRESSURE: 125 MMHG | RESPIRATION RATE: 18 BRPM | OXYGEN SATURATION: 97 % | HEART RATE: 70 BPM | BODY MASS INDEX: 41.75 KG/M2 | DIASTOLIC BLOOD PRESSURE: 79 MMHG

## 2024-01-04 DIAGNOSIS — J20.9 ACUTE BRONCHITIS, UNSPECIFIED ORGANISM: Primary | ICD-10-CM

## 2024-01-04 PROCEDURE — 71046 X-RAY EXAM CHEST 2 VIEWS: CPT | Performed by: PHYSICIAN ASSISTANT

## 2024-01-04 PROCEDURE — 99213 OFFICE O/P EST LOW 20 MIN: CPT | Performed by: PHYSICIAN ASSISTANT

## 2024-01-04 RX ORDER — ALBUTEROL SULFATE 90 UG/1
2 AEROSOL, METERED RESPIRATORY (INHALATION) EVERY 4 HOURS PRN
Qty: 1 EACH | Refills: 0 | Status: SHIPPED | OUTPATIENT
Start: 2024-01-04 | End: 2024-02-03

## 2024-01-04 RX ORDER — PREDNISONE 20 MG/1
60 TABLET ORAL DAILY
Qty: 15 TABLET | Refills: 0 | Status: SHIPPED | OUTPATIENT
Start: 2024-01-04 | End: 2024-01-09

## 2024-01-04 NOTE — ED INITIAL ASSESSMENT (HPI)
Pt with cough for 7 days. Coughing up mucous and yesterday coughed up blood during coughing fit. Pt denies fever

## 2024-01-04 NOTE — ED PROVIDER NOTES
Patient Seen in: [facility]      History     Chief Complaint   Patient presents with    Cough/URI     Stated Complaint: cough /x 7 days    Subjective:   HPI    48-year-old male with a history of PE and DVT, has IVC filter presents to the IC with cough for 1 week.  Denies any fever.  Has wheezing.  Cough is productive with yellow mucus, noticed some pink tinge yesterday, none today.  No shortness of breath.    Objective:   Past Medical History:   Diagnosis Date    Blood clotting tendency (HCC)     right leg    Depression     DVT (deep venous thrombosis) (HCC)     Erectile dysfunction 3/31/2016    Presence of IVC filter 3/31/2016    Pulmonary embolism (HCC)               Past Surgical History:   Procedure Laterality Date    BACK SURGERY      CATH INFERIOR VENA CAVA FILTER INSERTION                  Social History     Socioeconomic History    Marital status:    Tobacco Use    Smoking status: Never    Smokeless tobacco: Never   Vaping Use    Vaping Use: Never used   Substance and Sexual Activity    Alcohol use: Yes     Alcohol/week: 3.0 standard drinks of alcohol     Types: 3 Standard drinks or equivalent per week     Comment: socially    Drug use: No              Review of Systems    Positive for stated complaint: cough /x 7 days  Other systems are as noted in HPI.  Constitutional and vital signs reviewed.      All other systems reviewed and negative except as noted above.    Physical Exam     ED Triage Vitals [01/04/24 1105]   /79   Pulse 70   Resp 18   Temp 97.1 °F (36.2 °C)   Temp src Temporal   SpO2 97 %   O2 Device None (Room air)       Current:/79   Pulse 70   Temp 97.1 °F (36.2 °C) (Temporal)   Resp 18   Ht 185.4 cm (6' 1\")   Wt (!) 151.5 kg   SpO2 97%   BMI 44.07 kg/m²         Physical Exam  Vitals and nursing note reviewed.   Constitutional:       Appearance: He is well-developed.   HENT:      Head: Atraumatic.      Right Ear: External ear normal.      Left Ear: External  ear normal.      Nose: Congestion and rhinorrhea present.      Mouth/Throat:      Mouth: Mucous membranes are moist.   Eyes:      Conjunctiva/sclera: Conjunctivae normal.   Cardiovascular:      Rate and Rhythm: Normal rate and regular rhythm.   Pulmonary:      Effort: Pulmonary effort is normal.      Breath sounds: Wheezing present.   Musculoskeletal:      Cervical back: Normal range of motion and neck supple.   Skin:     General: Skin is warm and dry.      Capillary Refill: Capillary refill takes less than 2 seconds.   Neurological:      Mental Status: He is alert and oriented to person, place, and time.   Psychiatric:         Mood and Affect: Mood normal.               ED Course   Labs Reviewed - No data to display     XR CHEST PA + LAT CHEST (CPT=71046)    Result Date: 1/4/2024  PROCEDURE:  XR CHEST PA + LAT CHEST (CPT=71046)  INDICATIONS:  cough /x 7 days  COMPARISON:  [facility], XR, XR CHEST PA + LAT CHEST (CPT=71020), 12/24/2017, 9:39 AM.  TECHNIQUE:  PA and lateral chest radiographs were obtained.  PATIENT STATED HISTORY: (As transcribed by Technologist)  Patient states he has had a cough and raspy breathing for one week.    FINDINGS:  Heart size is within normal limits.  Pleural spaces appear clear.  Mediastinal and hilar contours are normal.  No focal consolidation.  If clinical symptoms persist then recommend follow-up imaging.            CONCLUSION:  See above.   LOCATION:  [place]   Dictated by (CST): Chris Blackwell MD on 1/04/2024 at 11:46 AM     Finalized by (CST): Chris Blackwell MD on 1/04/2024 at 11:46 AM                    MDM      48-year-old male presents to the IC with cough for 1 week.  No fever.  Saw some pink tinge mucus yesterday, none today.    Differential diagnosis reflecting the complexity of care include: pneumonia, bronchitis, viral URI    Comorbidities that add complexity to management include: h/o PE, has IVF filter    My independent interpretation of studies of: CXR with no  infiltrate    Diagnostic tests and medications considered but not ordered were:dimer or CTA chest, patient states the pink-tinged sputum has resolved, denies any chest pain or shortness of breath.  Has IVF filter. Vital signs are stable, low suspicion for PE.      Patient informed of chest x-ray results.  Exam is consistent with bronchitis and bronchospasm.  Will start patient on albuterol inhaler and prednisone.  Close follow up with PCP.  All questions answered.                                     Medical Decision Making      Disposition and Plan     Clinical Impression:  1. Acute bronchitis, unspecified organism         Disposition:  Discharge  1/4/2024 11:51 am    Follow-up:  Teena Bangura, DO  [address] [facility] [address]  [phone]                Medications Prescribed:  Discharge Medication List as of 1/4/2024 11:51 AM        START taking these medications    Details   !! albuterol 108 (90 Base) MCG/ACT Inhalation Aero Soln Inhale 2 puffs into the lungs every 4 (four) hours as needed for Wheezing., Normal, Disp-1 each, R-0      predniSONE 20 MG Oral Tab Take 3 tablets (60 mg total) by mouth daily for 5 days., Normal, Disp-15 tablet, R-0       !! - Potential duplicate medications found. Please discuss with provider.

## 2024-01-04 NOTE — DISCHARGE INSTRUCTIONS
Albuterol inhaler as needed for wheezing  Prednisone daily.  Over the counter medications for cough/congestion.

## 2024-01-24 ENCOUNTER — TELEPHONE (OUTPATIENT)
Dept: FAMILY MEDICINE CLINIC | Facility: CLINIC | Age: 49
End: 2024-01-24

## 2024-01-24 DIAGNOSIS — L40.9 PSORIASIS OF SCALP: Primary | ICD-10-CM

## 2024-01-24 NOTE — TELEPHONE ENCOUNTER
N Spoke to pt- he states he needs a referral for possible psorias of the scalp?    He states he has talked to KE about this issue  RN Pended referral for provider to [facility]

## 2024-01-24 NOTE — TELEPHONE ENCOUNTER
PT STOPPED IN OFFICE TO PROVIDE NEW INSURANCE INFO - PT NEEDS NEW REFERRAL PLACED FOR [facility]    PT HAS APT ON 1/30/24    PLEASE ADV AND FAX NEW REFERRAL     THANK YOU

## 2024-01-26 DIAGNOSIS — F32.A DEPRESSION, UNSPECIFIED DEPRESSION TYPE: ICD-10-CM

## 2024-01-26 RX ORDER — ESCITALOPRAM OXALATE 10 MG/1
10 TABLET ORAL DAILY
Qty: 90 TABLET | Refills: 0 | Status: SHIPPED | OUTPATIENT
Start: 2024-01-26

## 2024-04-05 ENCOUNTER — OFFICE VISIT (OUTPATIENT)
Dept: FAMILY MEDICINE CLINIC | Facility: CLINIC | Age: 49
End: 2024-04-05
Payer: COMMERCIAL

## 2024-04-05 VITALS
OXYGEN SATURATION: 98 % | DIASTOLIC BLOOD PRESSURE: 80 MMHG | TEMPERATURE: 98 F | WEIGHT: 315 LBS | SYSTOLIC BLOOD PRESSURE: 138 MMHG | RESPIRATION RATE: 22 BRPM | HEART RATE: 71 BPM | BODY MASS INDEX: 46 KG/M2

## 2024-04-05 DIAGNOSIS — S86.911A STRAIN OF RIGHT KNEE, INITIAL ENCOUNTER: Primary | ICD-10-CM

## 2024-04-05 DIAGNOSIS — I89.0 LYMPHEDEMA OF RIGHT LOWER EXTREMITY: ICD-10-CM

## 2024-04-05 PROCEDURE — 99214 OFFICE O/P EST MOD 30 MIN: CPT | Performed by: FAMILY MEDICINE

## 2024-04-05 PROCEDURE — 3075F SYST BP GE 130 - 139MM HG: CPT | Performed by: FAMILY MEDICINE

## 2024-04-05 PROCEDURE — 3079F DIAST BP 80-89 MM HG: CPT | Performed by: FAMILY MEDICINE

## 2024-04-05 RX ORDER — KETOCONAZOLE 20 MG/ML
SHAMPOO TOPICAL
COMMUNITY
Start: 2023-10-27

## 2024-04-05 NOTE — PROGRESS NOTES
Jed Patel is a 48 year old male.  Chief Complaint   Patient presents with    Knee Pain     Sunday        HPI:   He [place] He jumped on a trampoline. He landed wrong on right knee. It did not fully give out, it did not hear a pop or snap. The next day it was swollen and painful.   In  high school he had a knee injury on the same knee.   He has lymphedema in same leg from h/o DVT. He uses his pump at home somewhat regularly, but does not wrap.     Mon-Tues took ibuprofen, put a sleeve on it Monday.   It is getting better.     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 Looking for a new one.     ALLERGIES:  No Known Allergies      Current Outpatient Medications   Medication Sig Dispense Refill    ketoconazole 2 % External Shampoo APPLY TO SCALP 2 TO 3 TIME A WEEK AND LET SIT FOR 3 TO 5 MINUTES BEFORE RINSING.      escitalopram 10 MG Oral Tab Take 1 tablet (10 mg total) by mouth daily. 90 tablet 0    clobetasol 0.05 % External Solution APPLY TO AFFECTED AREA ON SCALP AT BEDTIME FOR 14 DAYS, THEN AS NEEDED      Albuterol Sulfate  (90 Base) MCG/ACT Inhalation Aero Soln Inhale 1 puff into the lungs every 4 (four) hours as needed for Wheezing. 1 Inhaler 0    aspirin 81 MG Oral Tab Take 1 tablet (81 mg total) by mouth daily.      Cinnamon 500 MG Oral Tab Take 1,000 mg by mouth daily.      clotrimazole-betamethasone 1-0.05 % External Cream Apply 1 Application. topically 2 (two) times daily as needed. (Patient not taking: Reported on 8/14/2023) 60 g 0    BUPROPION  MG Oral Tablet 24 Hr Take 1 tablet by mouth once daily (Patient not taking: Reported on 9/22/2022) 30 tablet 2    Cyclobenzaprine HCl (FLEXERIL OR) Take  by mouth. (Patient not taking: Reported on 7/9/2022)        Past Medical History:   Diagnosis Date    Blood clotting tendency (HCC)     right leg    Depression     DVT (deep venous thrombosis) (HCC)     Erectile dysfunction 3/31/2016    Presence of IVC filter  3/31/2016    Pulmonary embolism (HCC)       Social History:  Social History     Socioeconomic History    Marital status:    Tobacco Use    Smoking status: Never    Smokeless tobacco: Never   Vaping Use    Vaping Use: Never used   Substance and Sexual Activity    Alcohol use: Yes     Alcohol/week: 3.0 standard drinks of alcohol     Types: 3 Standard drinks or equivalent per week     Comment: socially    Drug use: No        BP Readings from Last 6 Encounters:   04/05/24 138/80   01/04/24 125/79   08/14/23 130/84   09/22/22 134/84   07/09/22 159/89   04/18/22 (!) 124/92       Wt Readings from Last 6 Encounters:   04/05/24 (!) 350 lb (158.8 kg)   01/04/24 (!) 334 lb (151.5 kg)   08/14/23 (!) 340 lb 6 oz (154.4 kg)   09/22/22 (!) 350 lb (158.8 kg)   07/09/22 (!) 345 lb (156.5 kg)   04/18/22 (!) 353 lb (160.1 kg)       REVIEW OF SYSTEMS:   GENERAL HEALTH: feels well no complaints other than above   SKIN: denies any unusual skin lesions or rashes  RESPIRATORY: denies shortness of breath with exertion  CARDIOVASCULAR: denies chest pain on exertion  GI: denies abdominal pain and denies heartburn  NEURO: denies headaches or dizziness     EXAM:   /80 (BP Location: Right arm, Patient Position: Sitting, Cuff Size: large)   Pulse 71   Temp 98 °F (36.7 °C) (Temporal)   Resp 22   Wt (!) 350 lb (158.8 kg)   SpO2 98%   BMI 46.18 kg/m²  Body mass index is 46.18 kg/m².      GENERAL: well developed, well nourished,in no apparent distress  SKIN: no rashes,no suspicious lesions  HEENT: atraumatic, normocephalic   NECK: supple,no adenopathy,   LUNGS: clear to auscultation  CARDIO: RRR without murmur  GI: good BS's,no masses, HSM or tenderness  EXTREMITIES: no cyanosis, + RLE with lymphedema, skin intact, no redness. Leg is swollen and hard.   Musc; + tenderness in anterior knee, worse with flexion. Neg varus/valgus stress and drawer testing. No weakness, almost normal ROM, but pain with extreme flexion and extension. No  pain with meniscus stress (standing on one leg and twisting).     ASSESSMENT AND PLAN:     Encounter Diagnoses   Name Primary?    Strain of right knee, initial encounter Yes    Lymphedema of right lower extremity        Diagnoses and all orders for this visit:    Strain of right knee, initial encounter    Lymphedema of right lower extremity    Continue compression, NSAIDS as needed.   Use compression device for lymphedema more regularly while this is healing.   If not getting better, consider PT and/or imaging.     No orders of the defined types were placed in this encounter.              Meds & Refills for this Visit:  Requested Prescriptions      No prescriptions requested or ordered in this encounter             The patient indicates understanding of these issues and agrees to the plan.

## 2024-06-07 DIAGNOSIS — F32.A DEPRESSION, UNSPECIFIED DEPRESSION TYPE: ICD-10-CM

## 2024-06-07 RX ORDER — ESCITALOPRAM OXALATE 10 MG/1
10 TABLET ORAL DAILY
Qty: 90 TABLET | Refills: 0 | Status: SHIPPED | OUTPATIENT
Start: 2024-06-07

## 2024-06-24 ENCOUNTER — OFFICE VISIT (OUTPATIENT)
Dept: FAMILY MEDICINE CLINIC | Facility: CLINIC | Age: 49
End: 2024-06-24

## 2024-06-24 VITALS
RESPIRATION RATE: 22 BRPM | TEMPERATURE: 97 F | OXYGEN SATURATION: 96 % | SYSTOLIC BLOOD PRESSURE: 140 MMHG | HEART RATE: 81 BPM | DIASTOLIC BLOOD PRESSURE: 80 MMHG

## 2024-06-24 DIAGNOSIS — I87.2 VENOUS STASIS DERMATITIS: ICD-10-CM

## 2024-06-24 DIAGNOSIS — L84 CALLUS OF HEEL: Primary | ICD-10-CM

## 2024-06-24 DIAGNOSIS — I89.0 LYMPHEDEMA OF RIGHT LOWER EXTREMITY: ICD-10-CM

## 2024-06-24 PROCEDURE — 99214 OFFICE O/P EST MOD 30 MIN: CPT | Performed by: FAMILY MEDICINE

## 2024-06-24 PROCEDURE — 3077F SYST BP >= 140 MM HG: CPT | Performed by: FAMILY MEDICINE

## 2024-06-24 PROCEDURE — 3079F DIAST BP 80-89 MM HG: CPT | Performed by: FAMILY MEDICINE

## 2024-06-24 NOTE — PROGRESS NOTES
Jed Patel is a 48 year old male.  Chief Complaint   Patient presents with    Follow - Up     R leg        HPI:   Right lower leg has been darker and wife was worried.   No increased swollen. More veins. Using lymphedema pump weekly, trying to use it more.   No increased pain.     Getting microblisters on bottom of right foot. Some on hand as well. They pop and heal quickly.     Questions on weight loss medications. Wegovy, tirzepetide.     ALLERGIES:  No Known Allergies      Current Outpatient Medications   Medication Sig Dispense Refill    escitalopram 10 MG Oral Tab Take 1 tablet (10 mg total) by mouth daily. 90 tablet 0    ketoconazole 2 % External Shampoo APPLY TO SCALP 2 TO 3 TIME A WEEK AND LET SIT FOR 3 TO 5 MINUTES BEFORE RINSING.      Albuterol Sulfate  (90 Base) MCG/ACT Inhalation Aero Soln Inhale 1 puff into the lungs every 4 (four) hours as needed for Wheezing. 1 Inhaler 0    aspirin 81 MG Oral Tab Take 1 tablet (81 mg total) by mouth daily.      Cinnamon 500 MG Oral Tab Take 1,000 mg by mouth daily.      clobetasol 0.05 % External Solution APPLY TO AFFECTED AREA ON SCALP AT BEDTIME FOR 14 DAYS, THEN AS NEEDED (Patient not taking: Reported on 6/24/2024)      clotrimazole-betamethasone 1-0.05 % External Cream Apply 1 Application. topically 2 (two) times daily as needed. (Patient not taking: Reported on 8/14/2023) 60 g 0    BUPROPION  MG Oral Tablet 24 Hr Take 1 tablet by mouth once daily (Patient not taking: Reported on 9/22/2022) 30 tablet 2    Cyclobenzaprine HCl (FLEXERIL OR) Take  by mouth. (Patient not taking: Reported on 7/9/2022)        Past Medical History:    Blood clotting tendency (HCC)    right leg    Depression    DVT (deep venous thrombosis) (HCC)    Erectile dysfunction    Presence of IVC filter    Pulmonary embolism (HCC)      Social History:  Social History     Socioeconomic History    Marital status:    Tobacco Use    Smoking status: Never    Smokeless  tobacco: Never   Vaping Use    Vaping status: Never Used   Substance and Sexual Activity    Alcohol use: Yes     Alcohol/week: 3.0 standard drinks of alcohol     Types: 3 Standard drinks or equivalent per week     Comment: socially    Drug use: No        BP Readings from Last 6 Encounters:   06/24/24 140/80   04/05/24 138/80   01/04/24 125/79   08/14/23 130/84   09/22/22 134/84   07/09/22 159/89       Wt Readings from Last 6 Encounters:   04/05/24 (!) 350 lb (158.8 kg)   01/04/24 (!) 334 lb (151.5 kg)   08/14/23 (!) 340 lb 6 oz (154.4 kg)   09/22/22 (!) 350 lb (158.8 kg)   07/09/22 (!) 345 lb (156.5 kg)   04/18/22 (!) 353 lb (160.1 kg)       REVIEW OF SYSTEMS:   GENERAL HEALTH: feels well no complaints  SKIN: denies any unusual skin lesions or rashes  RESPIRATORY: denies shortness of breath with exertion  CARDIOVASCULAR: denies chest pain on exertion  GI: denies abdominal pain and denies heartburn  NEURO: denies headaches    EXAM:   /80 (BP Location: Left arm, Patient Position: Sitting, Cuff Size: large)   Pulse 81   Temp 96.5 °F (35.8 °C) (Temporal)   Resp 22   SpO2 96%  There is no height or weight on file to calculate BMI.      GENERAL: well developed, well nourished,in no apparent distress  SKIN: no rashes,no suspicious lesions  HEENT: atraumatic, normocephalic,ears and throat are clear  NECK: supple,no adenopathy,   LUNGS: clear to auscultation  CARDIO: RRR without murmur  GI: good BS's,no masses, HSM or tenderness  EXTREMITIES: + edema and darkening skin in b/l LE, but R>>L with dry skin, some flakes, no open skin on legs or feet. Thick callus on right heel with cracks. No evidence of infection.     ASSESSMENT AND PLAN:     Encounter Diagnoses   Name Primary?    Callus of heel Yes    Venous stasis dermatitis     Lymphedema of right lower extremity        Diagnoses and all orders for this visit:    Callus of heel  -     Podiatry Referral - In Network    Venous stasis dermatitis    Lymphedema of right  lower extremity    Refer to podiatry for skin care.   Advised to get compression stockings, use lymphedema pump more often.   Keep lotioning legs.   Consider steroid or antifungal if dry skin is getting worse.     No orders of the defined types were placed in this encounter.              Meds & Refills for this Visit:  Requested Prescriptions      No prescriptions requested or ordered in this encounter             The patient indicates understanding of these issues and agrees to the plan.

## 2024-07-01 ENCOUNTER — PATIENT MESSAGE (OUTPATIENT)
Dept: FAMILY MEDICINE CLINIC | Facility: CLINIC | Age: 49
End: 2024-07-01

## 2024-07-01 DIAGNOSIS — I89.0 LYMPHEDEMA OF RIGHT LOWER EXTREMITY: Primary | ICD-10-CM

## 2024-07-01 DIAGNOSIS — I87.2 VENOUS STASIS DERMATITIS: ICD-10-CM

## 2024-07-01 NOTE — TELEPHONE ENCOUNTER
From: Jed Patel  To: Teena Bangura  Sent: 7/1/2024 11:14 AM CDT  Subject: Compression fitting    Emi suggested that I ask you to see if I could get a referral for [facility] to be fitted for a compression sleeve for my leg(s) like the ones on her arm and hand.

## 2024-07-24 ENCOUNTER — PATIENT MESSAGE (OUTPATIENT)
Dept: FAMILY MEDICINE CLINIC | Facility: CLINIC | Age: 49
End: 2024-07-24

## 2024-07-25 NOTE — TELEPHONE ENCOUNTER
From: Jed Patel  To: Teena Bangura  Sent: 7/24/2024 8:08 PM CDT  Subject: Compression     Hey Doc, when we called they said they didn’t get the referral. Thanks.

## 2024-08-06 ENCOUNTER — MED REC SCAN ONLY (OUTPATIENT)
Dept: FAMILY MEDICINE CLINIC | Facility: CLINIC | Age: 49
End: 2024-08-06

## 2024-08-06 ENCOUNTER — TELEPHONE (OUTPATIENT)
Dept: FAMILY MEDICINE CLINIC | Facility: CLINIC | Age: 49
End: 2024-08-06

## 2024-08-06 NOTE — TELEPHONE ENCOUNTER
Order for compression garments completed by Dr Bangura and faxed back to [facility]

## 2024-09-12 ENCOUNTER — TELEPHONE (OUTPATIENT)
Dept: FAMILY MEDICINE CLINIC | Facility: CLINIC | Age: 49
End: 2024-09-12

## 2024-09-12 ENCOUNTER — MED REC SCAN ONLY (OUTPATIENT)
Dept: FAMILY MEDICINE CLINIC | Facility: CLINIC | Age: 49
End: 2024-09-12

## 2024-09-12 NOTE — TELEPHONE ENCOUNTER
Order and clinical notes faxed back to [facility] for compression garments

## 2024-10-30 DIAGNOSIS — F32.A DEPRESSION, UNSPECIFIED DEPRESSION TYPE: ICD-10-CM

## 2024-10-31 RX ORDER — ESCITALOPRAM OXALATE 10 MG/1
10 TABLET ORAL DAILY
Qty: 90 TABLET | Refills: 0 | Status: SHIPPED | OUTPATIENT
Start: 2024-10-31

## 2024-10-31 NOTE — TELEPHONE ENCOUNTER
Psychiatric Non-Scheduled (Anti-Anxiety) Eampxh42/30/2024 08:13 PM   Protocol Details In person appointment or virtual visit in the past 6 mos or appointment in next 3 mos    Depression Screening completed within the past 12 months

## 2024-11-16 ENCOUNTER — TELEPHONE (OUTPATIENT)
Dept: FAMILY MEDICINE CLINIC | Facility: CLINIC | Age: 49
End: 2024-11-16

## 2024-11-16 DIAGNOSIS — I89.0 LYMPHEDEMA OF RIGHT LOWER EXTREMITY: Primary | ICD-10-CM

## 2024-11-16 NOTE — TELEPHONE ENCOUNTER
Marianne with [employer] calling stating having problems getting fax through regarding patient prescription. Requesting call back once fax is received or alternate method of communication. Confirmed call back number is [phone] opt. 3, confirmed fax number with Marianne. FYI    Received, attempted to call back [facility] and no answer. Document placed in RN in-box.

## 2024-11-18 ENCOUNTER — MED REC SCAN ONLY (OUTPATIENT)
Dept: FAMILY MEDICINE CLINIC | Facility: CLINIC | Age: 49
End: 2024-11-18

## 2024-12-03 ENCOUNTER — TELEPHONE (OUTPATIENT)
Dept: FAMILY MEDICINE CLINIC | Facility: CLINIC | Age: 49
End: 2024-12-03

## 2024-12-03 DIAGNOSIS — Z20.818 EXPOSURE TO PERTUSSIS: Primary | ICD-10-CM

## 2024-12-03 RX ORDER — AZITHROMYCIN 250 MG/1
TABLET, FILM COATED ORAL
Qty: 6 TABLET | Refills: 0 | Status: SHIPPED | OUTPATIENT
Start: 2024-12-03 | End: 2024-12-08

## 2024-12-03 NOTE — TELEPHONE ENCOUNTER
Patient wife calling. Patient son has pertussis. Er recommended prophylactic antibiotic for household members. States no allergy to zpack.  [facility] 47/34    Routed to Dr Bangura to advise

## 2025-04-11 ENCOUNTER — OFFICE VISIT (OUTPATIENT)
Dept: FAMILY MEDICINE CLINIC | Facility: CLINIC | Age: 50
End: 2025-04-11
Payer: COMMERCIAL

## 2025-04-11 VITALS
HEART RATE: 87 BPM | OXYGEN SATURATION: 98 % | SYSTOLIC BLOOD PRESSURE: 136 MMHG | DIASTOLIC BLOOD PRESSURE: 78 MMHG | BODY MASS INDEX: 46 KG/M2 | RESPIRATION RATE: 18 BRPM | TEMPERATURE: 97 F | WEIGHT: 315 LBS

## 2025-04-11 DIAGNOSIS — L03.031 PARONYCHIA OF GREAT TOE OF RIGHT FOOT: Primary | ICD-10-CM

## 2025-04-11 DIAGNOSIS — F32.A DEPRESSION, UNSPECIFIED DEPRESSION TYPE: ICD-10-CM

## 2025-04-11 PROCEDURE — 87077 CULTURE AEROBIC IDENTIFY: CPT | Performed by: FAMILY MEDICINE

## 2025-04-11 PROCEDURE — 99214 OFFICE O/P EST MOD 30 MIN: CPT | Performed by: FAMILY MEDICINE

## 2025-04-11 PROCEDURE — 87205 SMEAR GRAM STAIN: CPT | Performed by: FAMILY MEDICINE

## 2025-04-11 PROCEDURE — 87186 SC STD MICRODIL/AGAR DIL: CPT | Performed by: FAMILY MEDICINE

## 2025-04-11 PROCEDURE — 87070 CULTURE OTHR SPECIMN AEROBIC: CPT | Performed by: FAMILY MEDICINE

## 2025-04-11 PROCEDURE — 3078F DIAST BP <80 MM HG: CPT | Performed by: FAMILY MEDICINE

## 2025-04-11 PROCEDURE — 87075 CULTR BACTERIA EXCEPT BLOOD: CPT | Performed by: FAMILY MEDICINE

## 2025-04-11 PROCEDURE — 3075F SYST BP GE 130 - 139MM HG: CPT | Performed by: FAMILY MEDICINE

## 2025-04-11 RX ORDER — ESCITALOPRAM OXALATE 10 MG/1
10 TABLET ORAL DAILY
Qty: 90 TABLET | Refills: 1 | Status: SHIPPED | OUTPATIENT
Start: 2025-04-11

## 2025-04-11 NOTE — PROGRESS NOTES
Jed Patel is a 49 year old male.  Chief Complaint   Patient presents with    Pain     R big toe pain and swelling x 4 days        HPI:   Toe started bothering him on Sunday.   Yesterday ir was red and inflamed.   Today he noticed some drainage on sock, some blood.     No fevers.     ALLERGIES:  Allergies[1]      Current Medications[2]   Past Medical History[3]   Social History:  Short Social Hx on File[4]     BP Readings from Last 6 Encounters:   04/11/25 136/78   06/24/24 140/80   04/05/24 138/80   01/04/24 125/79   08/14/23 130/84   09/22/22 134/84       Wt Readings from Last 6 Encounters:   04/11/25 (!) 347 lb 12.8 oz (157.8 kg)   04/05/24 (!) 350 lb (158.8 kg)   01/04/24 (!) 334 lb (151.5 kg)   08/14/23 (!) 340 lb 6 oz (154.4 kg)   09/22/22 (!) 350 lb (158.8 kg)   07/09/22 (!) 345 lb (156.5 kg)       REVIEW OF SYSTEMS:   GENERAL HEALTH: feels well no complaints other than above   SKIN: denies any unusual skin lesions or rashes  RESPIRATORY: denies shortness of breath    CARDIOVASCULAR: denies chest pain    GI: denies abdominal pain and denies heartburn  NEURO: denies headaches    EXAM:   /78   Pulse 87   Temp 97.4 °F (36.3 °C) (Temporal)   Resp 18   Wt (!) 347 lb 12.8 oz (157.8 kg)   SpO2 98%   BMI 45.89 kg/m²  Body mass index is 45.89 kg/m².      GENERAL: well developed, well nourished,in no apparent distress  SKIN: no rashes,no suspicious lesions  HEENT: atraumatic, normocephalic,  NECK: supple,no adenopathy,   LUNGS: clear to auscultation  CARDIO: RRR without murmur  GI: good BS's,no masses, HSM or tenderness  EXTREMITIES: no cyanosis, clubbing or edema, right great toe is red and swollen, painful to touch over toenail and drainage from nailbed.     ASSESSMENT AND PLAN:     Encounter Diagnoses   Name Primary?    Paronychia of great toe of right foot Yes    Depression, unspecified depression type        Diagnoses and all orders for this visit:    Paronychia of great toe of right foot  -      amoxicillin clavulanate 875-125 MG Oral Tab; Take 1 tablet by mouth 2 (two) times daily for 10 days.  -     ANAEROBIC AND AEROBIC CULTURE [4446] [Q]  - treat as above.   - RTC or go to ER if worsening.     Depression, unspecified depression type  -     escitalopram 10 MG Oral Tab; Take 1 tablet (10 mg total) by mouth daily.        Orders Placed This Encounter   Procedures    ANAEROBIC AND AEROBIC CULTURE [4446] [Q]     Specimen Source?:   Toe     [facility]: Source?::   drainage     Release to patient:   Immediate               Meds & Refills for this Visit:  Requested Prescriptions     Signed Prescriptions Disp Refills    amoxicillin clavulanate 875-125 MG Oral Tab 20 tablet 0     Sig: Take 1 tablet by mouth 2 (two) times daily for 10 days.    escitalopram 10 MG Oral Tab 90 tablet 1     Sig: Take 1 tablet (10 mg total) by mouth daily.             The patient indicates understanding of these issues and agrees to the plan.               [1] No Known Allergies  [2]   Current Outpatient Medications   Medication Sig Dispense Refill    amoxicillin clavulanate 875-125 MG Oral Tab Take 1 tablet by mouth 2 (two) times daily for 10 days. 20 tablet 0    escitalopram 10 MG Oral Tab Take 1 tablet (10 mg total) by mouth daily. 90 tablet 1    ketoconazole 2 % External Shampoo APPLY TO SCALP 2 TO 3 TIME A WEEK AND LET SIT FOR 3 TO 5 MINUTES BEFORE RINSING.      Albuterol Sulfate  (90 Base) MCG/ACT Inhalation Aero Soln Inhale 1 puff into the lungs every 4 (four) hours as needed for Wheezing. 1 Inhaler 0    aspirin 81 MG Oral Tab Take 1 tablet (81 mg total) by mouth daily.      Cinnamon 500 MG Oral Tab Take 1,000 mg by mouth daily.      clobetasol 0.05 % External Solution APPLY TO AFFECTED AREA ON SCALP AT BEDTIME FOR 14 DAYS, THEN AS NEEDED (Patient not taking: Reported on 4/11/2025)      clotrimazole-betamethasone 1-0.05 % External Cream Apply 1 Application. topically 2 (two) times daily as needed. (Patient not taking: Reported  on 4/11/2025) 60 g 0    BUPROPION  MG Oral Tablet 24 Hr Take 1 tablet by mouth once daily (Patient not taking: Reported on 4/11/2025) 30 tablet 2    Cyclobenzaprine HCl (FLEXERIL OR) Take  by mouth. (Patient not taking: Reported on 4/11/2025)     [3]   Past Medical History:   Blood clotting tendency (HCC)    right leg    Depression    DVT (deep venous thrombosis) (HCC)    Erectile dysfunction    Presence of IVC filter    Pulmonary embolism (HCC)   [4]   Social History  Socioeconomic History    Marital status:    Tobacco Use    Smoking status: Never    Smokeless tobacco: Never   Vaping Use    Vaping status: Never Used   Substance and Sexual Activity    Alcohol use: Yes     Alcohol/week: 3.0 standard drinks of alcohol     Types: 3 Standard drinks or equivalent per week     Comment: socially    Drug use: No     Social Drivers of Health     Food Insecurity: No Food Insecurity (4/11/2025)    NCSS - Food Insecurity     Worried About Running Out of Food in the Last Year: No     Ran Out of Food in the Last Year: No   Transportation Needs: No Transportation Needs (4/11/2025)    NCSS - Transportation     Lack of Transportation: No   Housing Stability: Not At Risk (4/11/2025)    NCSS - Housing/Utilities     Has Housing: Yes     Worried About Losing Housing: No     Unable to Get Utilities: No

## 2025-04-14 ENCOUNTER — TELEPHONE (OUTPATIENT)
Dept: FAMILY MEDICINE CLINIC | Facility: CLINIC | Age: 50
End: 2025-04-14

## 2025-04-14 DIAGNOSIS — L03.031 PARONYCHIA OF GREAT TOE OF RIGHT FOOT: Primary | ICD-10-CM

## 2025-04-14 RX ORDER — SULFAMETHOXAZOLE AND TRIMETHOPRIM 800; 160 MG/1; MG/1
1 TABLET ORAL 2 TIMES DAILY
Qty: 20 TABLET | Refills: 0 | Status: SHIPPED | OUTPATIENT
Start: 2025-04-14 | End: 2025-04-24

## 2025-04-14 NOTE — TELEPHONE ENCOUNTER
Patient called to check in on response. Verified no need to complete at [facility] as insurance prefers [facility] physician's office.   Patient verbalized understanding and is canceling appointment with [facility].

## 2025-04-14 NOTE — TELEPHONE ENCOUNTER
Name and  verified  Patient notified of PCP recommendations.   Patient verbalized understanding.    Patient would like to verify that he does not need to go to [facility] for blood draw.

## 2025-04-14 NOTE — TELEPHONE ENCOUNTER
Pls let pt know that bacteria grew that will not go away with the antibiotic he is on. I am going to send in bactrim instead. He can stop the augmentin.

## 2025-04-14 NOTE — TELEPHONE ENCOUNTER
No, with his HMO, he should do labs through, [facility], correct?   Not [facility].   I did not order any blood work. But, he is due for a regular check up. Please schedule. We can do labs while he's here.

## 2025-04-21 ENCOUNTER — OFFICE VISIT (OUTPATIENT)
Dept: FAMILY MEDICINE CLINIC | Facility: CLINIC | Age: 50
End: 2025-04-21
Payer: COMMERCIAL

## 2025-04-21 VITALS
HEART RATE: 87 BPM | TEMPERATURE: 97 F | OXYGEN SATURATION: 98 % | BODY MASS INDEX: 45 KG/M2 | WEIGHT: 315 LBS | SYSTOLIC BLOOD PRESSURE: 136 MMHG | RESPIRATION RATE: 20 BRPM | DIASTOLIC BLOOD PRESSURE: 88 MMHG

## 2025-04-21 DIAGNOSIS — I89.0 LYMPHEDEMA OF RIGHT LOWER EXTREMITY: ICD-10-CM

## 2025-04-21 DIAGNOSIS — R60.0 LOWER EXTREMITY EDEMA: Primary | ICD-10-CM

## 2025-04-21 DIAGNOSIS — I87.2 VENOUS STASIS DERMATITIS: ICD-10-CM

## 2025-04-21 PROCEDURE — 3075F SYST BP GE 130 - 139MM HG: CPT | Performed by: FAMILY MEDICINE

## 2025-04-21 PROCEDURE — 3079F DIAST BP 80-89 MM HG: CPT | Performed by: FAMILY MEDICINE

## 2025-04-21 PROCEDURE — 99214 OFFICE O/P EST MOD 30 MIN: CPT | Performed by: FAMILY MEDICINE

## 2025-04-21 RX ORDER — FUROSEMIDE 20 MG/1
20 TABLET ORAL 2 TIMES DAILY PRN
Qty: 30 TABLET | Refills: 0 | Status: SHIPPED | OUTPATIENT
Start: 2025-04-21

## 2025-04-21 NOTE — PROGRESS NOTES
Jed Patel is a 49 year old male.  Chief Complaint   Patient presents with    Edema     Weeping       HPI:   I saw him 10 days ago - dx parynychia.  Augmentin, changed to bactim.     Not using pump, broke a couple weeks ago. He has a call out to tech on that.   Using compression socks.   It started weeping on the leg over a week ago, after I saw him.   Clear fluid. No redness or pain.     ALLERGIES:  Allergies[1]      Current Medications[2]   Past Medical History[3]   Social History:  Short Social Hx on File[4]     BP Readings from Last 6 Encounters:   04/21/25 136/88   04/11/25 136/78   06/24/24 140/80   04/05/24 138/80   01/04/24 125/79   08/14/23 130/84       Wt Readings from Last 6 Encounters:   04/21/25 (!) 341 lb (154.7 kg)   04/11/25 (!) 347 lb 12.8 oz (157.8 kg)   04/05/24 (!) 350 lb (158.8 kg)   01/04/24 (!) 334 lb (151.5 kg)   08/14/23 (!) 340 lb 6 oz (154.4 kg)   09/22/22 (!) 350 lb (158.8 kg)       REVIEW OF SYSTEMS:   GENERAL HEALTH: feels well no complaints  SKIN: denies any unusual skin lesions or rashes  RESPIRATORY: denies shortness of breath    CARDIOVASCULAR: denies chest pain    GI: denies abdominal pain and denies heartburn  NEURO: denies headaches    EXAM:   /88   Pulse 87   Temp 97 °F (36.1 °C) (Temporal)   Resp 20   Wt (!) 341 lb (154.7 kg)   SpO2 98%   BMI 44.99 kg/m²  Body mass index is 44.99 kg/m².      GENERAL: well developed, well nourished,in no apparent distress  SKIN: no rashes,no suspicious lesions  HEENT: atraumatic, normocephalic,ears and throat are clear  NECK: supple,no adenopathy,   LUNGS: clear to auscultation, no rales or wheezing   CARDIO: RRR without murmur  GI: good BS's,no masses, HSM or tenderness  EXTREMITIES: + right great toe is better, no longer infected. + edema in RLE with weeping from opening on shin, clear fluid draining. No redness, tenderness or warmth.     ASSESSMENT AND PLAN:     Encounter Diagnoses   Name Primary?    Lower extremity edema  Yes    Lymphedema of right lower extremity     Venous stasis dermatitis        Diagnoses and all orders for this visit:    Lower extremity edema  -     furosemide 20 MG Oral Tab; Take 1 tablet (20 mg total) by mouth 2 (two) times daily as needed (lower extremity edema).    Lymphedema of right lower extremity  -     furosemide 20 MG Oral Tab; Take 1 tablet (20 mg total) by mouth 2 (two) times daily as needed (lower extremity edema).    Venous stasis dermatitis  -     furosemide 20 MG Oral Tab; Take 1 tablet (20 mg total) by mouth 2 (two) times daily as needed (lower extremity edema).    Water pill for a few days, 3-4 days BID, then as needed.   Increase compression stockings usage or use ace wraps.   Does not look infected.   He is working on getting his home lymphedema pump back up and working again.   Consider referral back to PT for lymph treatment if not improving.     No orders of the defined types were placed in this encounter.              Meds & Refills for this Visit:  Requested Prescriptions     Signed Prescriptions Disp Refills    furosemide 20 MG Oral Tab 30 tablet 0     Sig: Take 1 tablet (20 mg total) by mouth 2 (two) times daily as needed (lower extremity edema).             The patient indicates understanding of these issues and agrees to the plan.               [1] No Known Allergies  [2]   Current Outpatient Medications   Medication Sig Dispense Refill    furosemide 20 MG Oral Tab Take 1 tablet (20 mg total) by mouth 2 (two) times daily as needed (lower extremity edema). 30 tablet 0    sulfamethoxazole-trimethoprim -160 MG Oral Tab per tablet Take 1 tablet by mouth 2 (two) times daily for 10 days. 20 tablet 0    escitalopram 10 MG Oral Tab Take 1 tablet (10 mg total) by mouth daily. 90 tablet 1    ketoconazole 2 % External Shampoo APPLY TO SCALP 2 TO 3 TIME A WEEK AND LET SIT FOR 3 TO 5 MINUTES BEFORE RINSING.      Albuterol Sulfate  (90 Base) MCG/ACT Inhalation Aero Soln Inhale 1 puff  into the lungs every 4 (four) hours as needed for Wheezing. 1 Inhaler 0    aspirin 81 MG Oral Tab Take 1 tablet (81 mg total) by mouth daily.      Cinnamon 500 MG Oral Tab Take 1,000 mg by mouth daily.      amoxicillin clavulanate 875-125 MG Oral Tab Take 1 tablet by mouth 2 (two) times daily for 10 days. (Patient not taking: Reported on 4/21/2025) 20 tablet 0    clobetasol 0.05 % External Solution APPLY TO AFFECTED AREA ON SCALP AT BEDTIME FOR 14 DAYS, THEN AS NEEDED (Patient not taking: Reported on 4/21/2025)      clotrimazole-betamethasone 1-0.05 % External Cream Apply 1 Application. topically 2 (two) times daily as needed. (Patient not taking: Reported on 4/21/2025) 60 g 0    BUPROPION  MG Oral Tablet 24 Hr Take 1 tablet by mouth once daily (Patient not taking: Reported on 4/21/2025) 30 tablet 2    Cyclobenzaprine HCl (FLEXERIL OR) Take  by mouth. (Patient not taking: Reported on 4/21/2025)     [3]   Past Medical History:   Blood clotting tendency (HCC)    right leg    Depression    DVT (deep venous thrombosis) (HCC)    Erectile dysfunction    Presence of IVC filter    Pulmonary embolism (HCC)   [4]   Social History  Socioeconomic History    Marital status:    Tobacco Use    Smoking status: Never    Smokeless tobacco: Never   Vaping Use    Vaping status: Never Used   Substance and Sexual Activity    Alcohol use: Yes     Alcohol/week: 3.0 standard drinks of alcohol     Types: 3 Standard drinks or equivalent per week     Comment: socially    Drug use: No     Social Drivers of Health     Food Insecurity: No Food Insecurity (4/11/2025)    NCSS - Food Insecurity     Worried About Running Out of Food in the Last Year: No     Ran Out of Food in the Last Year: No   Transportation Needs: No Transportation Needs (4/11/2025)    NCSS - Transportation     Lack of Transportation: No   Housing Stability: Not At Risk (4/11/2025)    NCSS - Housing/Utilities     Has Housing: Yes     Worried About Losing Housing: No      Unable to Get Utilities: No

## 2025-05-23 ENCOUNTER — MED REC SCAN ONLY (OUTPATIENT)
Dept: FAMILY MEDICINE CLINIC | Facility: CLINIC | Age: 50
End: 2025-05-23

## 2025-05-23 ENCOUNTER — TELEPHONE (OUTPATIENT)
Dept: FAMILY MEDICINE CLINIC | Facility: CLINIC | Age: 50
End: 2025-05-23

## 2025-05-23 NOTE — TELEPHONE ENCOUNTER
Left message on voicemail/answering machine for patient to call office.    Please let patient know form has been faxed    Sent to scan

## (undated) DIAGNOSIS — F32.2 SEVERE SINGLE CURRENT EPISODE OF MAJOR DEPRESSIVE DISORDER, WITHOUT PSYCHOTIC FEATURES (HCC): ICD-10-CM

## (undated) NOTE — LETTER
[facility] CARE IN [place]  [address] [address]  Dept: [phone]  Dept Fax: [phone]         October 2, 2017    Patient: Marva Pang   YOB: 1975   Date of Visit: 10/2/2017       To Whom It May Concern:

## (undated) NOTE — LETTER
Date & Time: 1/20/2020, 2:05 PM  Patient: Galileo Carvalho  Encounter Provider(s):    LILO Abdul       To Whom It May Concern: Myranda Bennett was seen and treated in our department on 1/20/2020.  He should not return to work until 01/21/2020

## (undated) NOTE — LETTER
Date & Time: 7/9/2022, 11:02 AM  Patient: Mark Santana  Encounter Provider(s):    Henry Stokes PA-C       To Whom It May Concern: Viviana Uribe was seen and treated in our department on 7/9/2022. COVID-19 precautions; minimum 5-day quarantine from day of onset. If minimally symptomatic, 5 further days of mask usage.   If moderately symptomatic, 5 further days of strict quarantine    If you have any questions or concerns, please do not hesitate to call.        _____________________________  Physician/APC Signature

## (undated) NOTE — LETTER
[facility] CARE IN [place]  [address] [address]  Dept: [phone]  Dept Fax: [phone]      December 24, 2017    Patient: Jeremy Cankade   Date of Visit: 12/24/2017       To Whom It May Concern:     Christopher Noble was seen and

## (undated) NOTE — Clinical Note
Date: 5/18/2017    Patient Name: Naveen Arreola          To Whom it may concern: This letter has been written at the patient's request. The above patient was seen at the [facility] for treatment of a medical condition.     This patient s

## (undated) NOTE — LETTER
09/21/18          [address] [place] [id number]          Our records indicate you are due for a follow up appointment with Dr Nieves Rothman.     Please call our office at [phone], at your earliest convenience, to schedule an ap

## (undated) NOTE — ED AVS SNAPSHOT
THE [facility] in [place] [facility] [address] [address]    Phone:  [phone]    Fax:  Reyes Ambrose   MRN: [id number]    Department:  THE [facility] in [place]   Date of Visit:  1/23/2017           Richar Velez - triamcinolone acetonide 0.1 % Crea            Discharge References/Attachments     PHARYNGITIS, STREP (CONFIRMED) (ENGLISH)    ATOPIC DERMATITIS (ECZEMA) (ENGLISH)      Disclosure     Insurance plans vary and the physician(s) referred by the Immediate Ca IF THERE IS ANY CHANGE OR WORSENING OF YOUR CONDITION, CALL YOUR PRIMARY CARE PHYSICIAN AT ONCE OR GO TO THE EMERGENCY DEPARTMENT.     If you have been prescribed any medication(s), please fill your prescription right away and begin taking the medication( Patient [address] to help you get signed up for insurance coverage. Patient [address] is a Federal Navigator program that can help with your Affordable Care Act coverage, as well as all types of Medicaid plans.   To get signed up and covere

## (undated) NOTE — ED AVS SNAPSHOT
THE [facility]mediate [facility] in [facility] [address] [address]    Phone:  [phone]    Fax:  Eric Boggs   MRN: [id number]    Department:  THE [facility] in [place]   Date of Visit:  5/16/2017           Soto Oneill If you have any problems with your follow-up, please call our  at [phone]. Si usted tiene algun problema con [employer] sequimiento, por favor llame a nuestro adminstrador de casos al [phone].     Expect to receive an electronic reques Gertrude Jasso [address] ([address]) Geo ([address] [address] [facility]9   [facility] [address] ([address] [facility]) [address]  (Do